# Patient Record
Sex: FEMALE | Race: WHITE | NOT HISPANIC OR LATINO | Employment: FULL TIME | URBAN - METROPOLITAN AREA
[De-identification: names, ages, dates, MRNs, and addresses within clinical notes are randomized per-mention and may not be internally consistent; named-entity substitution may affect disease eponyms.]

---

## 2018-02-26 ENCOUNTER — OFFICE VISIT (OUTPATIENT)
Dept: FAMILY MEDICINE CLINIC | Facility: CLINIC | Age: 43
End: 2018-02-26
Payer: COMMERCIAL

## 2018-02-26 VITALS
OXYGEN SATURATION: 98 % | SYSTOLIC BLOOD PRESSURE: 110 MMHG | WEIGHT: 225 LBS | HEART RATE: 77 BPM | TEMPERATURE: 98.9 F | DIASTOLIC BLOOD PRESSURE: 78 MMHG | BODY MASS INDEX: 37.44 KG/M2

## 2018-02-26 DIAGNOSIS — J06.9 UPPER RESPIRATORY TRACT INFECTION, UNSPECIFIED TYPE: Primary | ICD-10-CM

## 2018-02-26 DIAGNOSIS — J06.9 VIRAL UPPER RESPIRATORY TRACT INFECTION: ICD-10-CM

## 2018-02-26 PROCEDURE — 99213 OFFICE O/P EST LOW 20 MIN: CPT | Performed by: PHYSICIAN ASSISTANT

## 2018-02-26 RX ORDER — DESOGESTREL AND ETHINYL ESTRADIOL AND ETHINYL ESTRADIOL 21-5 (28)
1 KIT ORAL DAILY
Refills: 4 | COMMUNITY
Start: 2018-01-27 | End: 2020-09-17 | Stop reason: ALTCHOICE

## 2018-02-26 NOTE — PROGRESS NOTES
Assessment/Plan:       Diagnoses and all orders for this visit:    Upper respiratory tract infection, unspecified type    Viral upper respiratory tract infection          Subjective:      Patient ID: Raul Mayorga is a 37 y o  female  Cough  Patient complains of dyspnea, headache , nasal congestion, nonproductive cough and sore throat  Symptoms began 3 days ago  Symptoms have been gradually improving since that time  The cough is dry and is aggravated by nothing  Associated symptoms include: change in voice  Patient does not have new pets  Patient does not have a history of asthma  Patient does not have a history of environmental allergens  Patient has not traveled recently  Patient does not have a history of smoking  Patient has not had a previous chest x-ray  Patient has not had a PPD done  The following portions of the patient's history were reviewed and updated as appropriate:   She has no past medical history on file  ,   does not have any pertinent problems on file  ,   has a past surgical history that includes Gastric bypass  ,  family history is not on file  ,   reports that she has never smoked  She has never used smokeless tobacco  She reports that she does not drink alcohol or use drugs  ,  has No Known Allergies     Current Outpatient Prescriptions   Medication Sig Dispense Refill    VIORELE 0 15-0 02/0 01 MG (21/5) per tablet Take 1 tablet by mouth daily  4     No current facility-administered medications for this visit  Review of Systems   Constitutional: Positive for fatigue  Negative for fever  HENT: Positive for congestion, postnasal drip, sinus pressure and sore throat  Respiratory: Positive for cough  Neurological: Positive for headaches  Objective:  Vitals:    02/26/18 1431   BP: 110/78   Pulse: 77   Temp: 98 9 °F (37 2 °C)   SpO2: 98%      Physical Exam   Constitutional: She is oriented to person, place, and time  She appears well-developed and well-nourished  HENT:   Head: Normocephalic  Right Ear: External ear normal  Tympanic membrane is not erythematous  No middle ear effusion  Left Ear: External ear normal  Tympanic membrane is not erythematous  No middle ear effusion  Nose: Nose normal    Mouth/Throat: Oropharynx is clear and moist  No oropharyngeal exudate  Cardiovascular: Normal rate and regular rhythm  Pulmonary/Chest: Effort normal and breath sounds normal  She has no wheezes  She has no rales  Neurological: She is alert and oriented to person, place, and time  Skin: Skin is warm and dry  Psychiatric: She has a normal mood and affect  Her behavior is normal    Nursing note and vitals reviewed

## 2018-02-26 NOTE — PATIENT INSTRUCTIONS
Cold Symptoms, Ambulatory Care   GENERAL INFORMATION:   Cold symptoms  include sneezing, dry throat, a stuffy nose, headache, watery eyes, and a cough  Your cough may be dry, or you may cough up mucus  You may also have muscle aches, joint pain, and tiredness  Rarely, you may have a fever  Cold symptoms occur from inflammation in your upper respiratory system caused by a virus  Most colds go away without treatment  Seek immediate care for the following symptoms:   · A heartbeat that is much faster than usual for you     · A swollen neck that is sore to the touch     · Increased tiredness and weakness    · Pinpoint or larger reddish-purple dots on your skin     · Poor or no appetite  Treatment for cold symptoms  may include NSAIDS to decrease muscle aches and fever  Do not give NSAID medicines to children under 10months of age without direction from your child's doctor  Cold medicines may also be given to decrease coughing, nasal stuffiness, sneezing, and a runny nose  Do not give cold medicines to children under 11years of age without direction from your child's doctor  Manage your cold symptoms with the following:   · Drink liquids  to help thin and loosen thick mucus so you can cough it up  Liquids will also keep you hydrated  Ask your healthcare provider which liquids are best for you and how much to drink each day  · Do not smoke  because it may worsen your symptoms and increase the length of time you feel sick  Talk with your healthcare provider if you need help to stop smoking  Prevent the spread of germs  by washing your hands often  You can spread your cold germs to others for at least 3 days after your symptoms start  Do not share items, such as eating utensils  Cover your nose and mouth when you cough or sneeze using the crook of your elbow instead of your hands  Throw used tissues in the garbage    Follow up with your healthcare provider as directed:  Write down your questions so you remember to ask them during your visits  CARE AGREEMENT:   You have the right to help plan your care  Learn about your health condition and how it may be treated  Discuss treatment options with your caregivers to decide what care you want to receive  You always have the right to refuse treatment  The above information is an  only  It is not intended as medical advice for individual conditions or treatments  Talk to your doctor, nurse or pharmacist before following any medical regimen to see if it is safe and effective for you  © 2014 [address] is for End User's use only and may not be sold, redistributed or otherwise used for commercial purposes  All illustrations and images included in CareNotes® are the copyrighted property of [employer]  or [employer]

## 2018-07-08 ENCOUNTER — OFFICE VISIT (OUTPATIENT)
Dept: URGENT CARE | Facility: CLINIC | Age: 43
End: 2018-07-08
Payer: COMMERCIAL

## 2018-07-08 ENCOUNTER — APPOINTMENT (OUTPATIENT)
Dept: RADIOLOGY | Facility: CLINIC | Age: 43
End: 2018-07-08
Payer: COMMERCIAL

## 2018-07-08 VITALS
WEIGHT: 218 LBS | HEIGHT: 64 IN | BODY MASS INDEX: 37.22 KG/M2 | TEMPERATURE: 98 F | SYSTOLIC BLOOD PRESSURE: 136 MMHG | OXYGEN SATURATION: 97 % | RESPIRATION RATE: 17 BRPM | HEART RATE: 97 BPM | DIASTOLIC BLOOD PRESSURE: 78 MMHG

## 2018-07-08 DIAGNOSIS — S69.91XA INJURY OF RIGHT WRIST, INITIAL ENCOUNTER: Primary | ICD-10-CM

## 2018-07-08 DIAGNOSIS — S69.91XA INJURY OF RIGHT WRIST, INITIAL ENCOUNTER: ICD-10-CM

## 2018-07-08 PROCEDURE — 99203 OFFICE O/P NEW LOW 30 MIN: CPT | Performed by: PHYSICIAN ASSISTANT

## 2018-07-08 PROCEDURE — 73110 X-RAY EXAM OF WRIST: CPT

## 2018-07-08 NOTE — PROGRESS NOTES
[facility]        NAME: Joe Philip is a 37 y o  female  : 1975    MRN: [id number]  DATE: 2018  TIME: 8:38 AM    Assessment and Plan   Injury of right wrist, initial encounter [S69 91XA]  1  Injury of right wrist, initial encounter  XR wrist 3+ vw right         Patient Instructions     No fracture on x-ray  Possible TFCC or UCL injury  Placed in a Velcro wrist splint  Ice and Motrin  If not improved in 1-2 weeks see Orthopedics  Follow up with PCP in 3-5 days  Proceed to  ER if symptoms worsen  Chief Complaint     Chief Complaint   Patient presents with    Wrist Injury     started yesterday, holding water felt pressure on Rt wrist, pt felt sharp pain, c/o pain and mild swelling/redness,limited ROM of rt wrist         History of Present Illness       78-year-old female complains of right ulnar wrist pain for 1 day  She was holding watering can up and felt a pain and pop in her wrist   She is right-handed  Painful gripping moving her hand  Review of Systems   Review of Systems      Current Medications       Current Outpatient Prescriptions:     Joeline Poot 0 15-0 02/0 01 MG () per tablet, Take 1 tablet by mouth daily, Disp: , Rfl: 4    Current Allergies     Allergies as of 2018    (No Known Allergies)            The following portions of the patient's history were reviewed and updated as appropriate: allergies, current medications, past family history, past medical history, past social history, past surgical history and problem list      History reviewed  No pertinent past medical history  Past Surgical History:   Procedure Laterality Date    GASTRIC BYPASS      10 years ago       Family History   Problem Relation Age of Onset    Asthma Mother     No Known Problems Father          Medications have been verified          Objective   /78   Pulse 97   Temp 98 °F (36 7 °C) (Tympanic)   Resp 17   Ht 5' 4" (1 626 m)   Wt 98 9 kg (218 lb)   SpO2 97% BMI 37 42 kg/m²        Physical Exam     Physical Exam   Constitutional: She appears well-developed and well-nourished  Musculoskeletal:   Right wrist no swelling or bruising  Tender palpation over the ulnar wrist and dorsal wrist   Wrist pain with ulnar deviation and flexion extension  Hand neurovascularly intact  Elbow nontender full range of motion

## 2018-07-08 NOTE — PATIENT INSTRUCTIONS
No fracture on x-ray  Possible TFCC or UCL injury  Placed in a Velcro wrist splint  Ice and Motrin  If not improved in 1-2 weeks see Orthopedics  Follow up with PCP in 3-5 days  Proceed to  ER if symptoms worsen

## 2019-03-01 ENCOUNTER — OFFICE VISIT (OUTPATIENT)
Dept: URGENT CARE | Facility: CLINIC | Age: 44
End: 2019-03-01
Payer: COMMERCIAL

## 2019-03-01 VITALS
OXYGEN SATURATION: 100 % | WEIGHT: 225 LBS | RESPIRATION RATE: 18 BRPM | TEMPERATURE: 98.2 F | SYSTOLIC BLOOD PRESSURE: 138 MMHG | DIASTOLIC BLOOD PRESSURE: 84 MMHG | BODY MASS INDEX: 38.41 KG/M2 | HEART RATE: 77 BPM | HEIGHT: 64 IN

## 2019-03-01 DIAGNOSIS — J01.90 ACUTE SINUSITIS, RECURRENCE NOT SPECIFIED, UNSPECIFIED LOCATION: Primary | ICD-10-CM

## 2019-03-01 PROCEDURE — 99213 OFFICE O/P EST LOW 20 MIN: CPT | Performed by: PHYSICIAN ASSISTANT

## 2019-03-01 RX ORDER — DIPHENOXYLATE HYDROCHLORIDE AND ATROPINE SULFATE 2.5; .025 MG/1; MG/1
1 TABLET ORAL
COMMUNITY

## 2019-03-01 RX ORDER — AMOXICILLIN AND CLAVULANATE POTASSIUM 875; 125 MG/1; MG/1
1 TABLET, FILM COATED ORAL EVERY 12 HOURS SCHEDULED
Qty: 20 TABLET | Refills: 0 | Status: SHIPPED | OUTPATIENT
Start: 2019-03-01 | End: 2019-03-11

## 2019-03-01 RX ORDER — METHYLPREDNISOLONE 4 MG/1
TABLET ORAL
Qty: 1 EACH | Refills: 0 | Status: SHIPPED | OUTPATIENT
Start: 2019-03-01 | End: 2019-04-01 | Stop reason: ALTCHOICE

## 2019-03-01 NOTE — PROGRESS NOTES
[facility]        NAME: Lio Villanueva is a 40 y o  female  : 1975    MRN: [id number]  DATE: 2019  TIME: 11:10 AM    Assessment and Plan   Acute sinusitis, recurrence not specified, unspecified location [J01 90]  1  Acute sinusitis, recurrence not specified, unspecified location  methylPREDNISolone 4 MG tablet therapy pack    amoxicillin-clavulanate (AUGMENTIN) 875-125 mg per tablet         Patient Instructions     Follow up with PCP in 3-5 days  Proceed to  ER if symptoms worsen  Chief Complaint     Chief Complaint   Patient presents with    Nasal Congestion     runny and stuffy, unable to breath out of nose, tried otc decongestion meds  , x 4 days    Sore Throat     x today    sinus pain/pressure     x 4 days    Chills     had the first 2 days    Earache     right ear x last night         History of Present Illness       Sinus Pain  Patient complains of congestion, cough, facial pain and post nasal drip  Onset of symptoms was 4 days ago  Symptoms have been gradually worsening since that time  She is drinking moderate amounts of fluids  Past history is significant for nothing  Patient is non-smoker  Review of Systems   Review of Systems   Constitutional: Negative for chills, fatigue and fever  HENT: Positive for congestion, ear pain, postnasal drip, sinus pressure and sinus pain  Negative for sore throat and trouble swallowing  Eyes: Negative for pain, discharge and redness  Respiratory: Positive for cough  Negative for chest tightness, shortness of breath and wheezing  Cardiovascular: Negative for chest pain, palpitations and leg swelling  Gastrointestinal: Negative for abdominal pain, diarrhea, nausea and vomiting  Musculoskeletal: Negative for arthralgias, joint swelling and myalgias  Skin: Negative for rash  Neurological: Negative for dizziness, weakness, numbness and headaches           Current Medications       Current Outpatient Medications:    VIORELE 0 15-0 02/0 01 MG (21/5) per tablet, Take 1 tablet by mouth daily, Disp: , Rfl: 4    amoxicillin-clavulanate (AUGMENTIN) 875-125 mg per tablet, Take 1 tablet by mouth every 12 (twelve) hours for 10 days, Disp: 20 tablet, Rfl: 0    methylPREDNISolone 4 MG tablet therapy pack, Use as directed on package, Disp: 1 each, Rfl: 0    multivitamin (THERAGRAN) TABS, Take 1 tablet by mouth, Disp: , Rfl:     Current Allergies     Allergies as of 03/01/2019    (No Known Allergies)            The following portions of the patient's history were reviewed and updated as appropriate: allergies, current medications, past family history, past medical history, past social history, past surgical history and problem list      History reviewed  No pertinent past medical history  Past Surgical History:   Procedure Laterality Date    GASTRIC BYPASS      10 years ago       Family History   Problem Relation Age of Onset    Asthma Mother     No Known Problems Father          Medications have been verified  Objective   /84 (BP Location: Left arm, Patient Position: Sitting, Cuff Size: Standard)   Pulse 77   Temp 98 2 °F (36 8 °C) (Tympanic)   Resp 18   Ht 5' 4" (1 626 m)   Wt 102 kg (225 lb)   SpO2 100%   BMI 38 62 kg/m²        Physical Exam     Physical Exam   Constitutional: She is oriented to person, place, and time  She appears well-developed and well-nourished  No distress  HENT:   Head: Normocephalic  Right Ear: External ear normal    Left Ear: External ear normal    Mouth/Throat: Posterior oropharyngeal erythema present  Eyes: Pupils are equal, round, and reactive to light  Conjunctivae and EOM are normal    Neck: Normal range of motion  Neck supple  Cardiovascular: Normal rate, regular rhythm and normal heart sounds  No murmur heard  Pulmonary/Chest: Effort normal and breath sounds normal  No respiratory distress  She has no wheezes  Abdominal: Soft   Bowel sounds are normal  There is no tenderness  Musculoskeletal: Normal range of motion  Lymphadenopathy:     She has no cervical adenopathy  Neurological: She is alert and oriented to person, place, and time  She has normal reflexes  Skin: Skin is warm and dry  Psychiatric: She has a normal mood and affect  Nursing note and vitals reviewed

## 2019-04-01 ENCOUNTER — OFFICE VISIT (OUTPATIENT)
Dept: FAMILY MEDICINE CLINIC | Facility: CLINIC | Age: 44
End: 2019-04-01
Payer: COMMERCIAL

## 2019-04-01 VITALS
HEART RATE: 88 BPM | HEIGHT: 64 IN | BODY MASS INDEX: 38.17 KG/M2 | OXYGEN SATURATION: 98 % | TEMPERATURE: 99 F | DIASTOLIC BLOOD PRESSURE: 88 MMHG | WEIGHT: 223.6 LBS | SYSTOLIC BLOOD PRESSURE: 126 MMHG

## 2019-04-01 DIAGNOSIS — L73.9 FOLLICULITIS: Primary | ICD-10-CM

## 2019-04-01 PROBLEM — J06.9 UPPER RESPIRATORY TRACT INFECTION: Status: RESOLVED | Noted: 2018-02-26 | Resolved: 2019-04-01

## 2019-04-01 PROCEDURE — 99213 OFFICE O/P EST LOW 20 MIN: CPT | Performed by: NURSE PRACTITIONER

## 2019-04-01 PROCEDURE — 3008F BODY MASS INDEX DOCD: CPT | Performed by: NURSE PRACTITIONER

## 2019-04-01 RX ORDER — CEPHALEXIN 500 MG/1
500 CAPSULE ORAL EVERY 8 HOURS SCHEDULED
Qty: 21 CAPSULE | Refills: 0 | Status: SHIPPED | OUTPATIENT
Start: 2019-04-01 | End: 2019-04-08

## 2019-07-05 NOTE — PROGRESS NOTES
Assessment/Plan:    Varicose veins of bilateral lower extremities with pain  Patient with symptomatic bilateral lower extremity varicosities with pain  We discussed the pathophysiology of venous disease  She will trial a course of conservative therapy to include the daily use of 20-30 mmHg compression stockings  We discussed the benefits of periodic elevation, regular physical activity and weight management  After a trial of conservative therapy she will have a venous reflux assessment to evaluate for valvular incompetence  Followup with vascular surgeon after imaging for review/to discuss response to conservative therapy and further possible treatment options  Diagnoses and all orders for this visit:    Varicose veins of bilateral lower extremities with pain  -     VAS reflux lower limb venous duplex study with reflux assessment, complete bilateral; Future  -     Compression Stocking  -     Compression Stocking    Other orders  -     ibuprofen (ADVIL) 200 mg tablet; Advil 200 mg tablet   Take 2 tablets every day by oral route  Subjective:      Patient ID: Jackelin Pathak is a 40 y o  female  Patient seen for evaluation of bilateral lower extremity varicose veins  She has bilateral lower extremity varicosities with pain  She complains of daily aching pain and tiredness to the legs, worse at end of day  She complains of pulsating pain to bilateral legs when laying in bed at night  The pain has gotten so bad over the past year that she is taking Aleve with only slight improvement in symptoms  She says that when she walks a lot or tries to exercise she gets swelling around her ankles  She elevates her legs occasionally, but doesn't notice any significant difference/improvement in symptoms  She does not wear compression stockings  No skin changes/tissue loss  Pt is new to our practice and is for an evaluation of painful bulging veins    Pt has a bulging vein in her right leg since her teens  Pt no has a bulging vein in her left leg for the past 6 months  Veins in both legs are painful  Pt does not wear compression stocking but does elevate her legs as much as possible  Pt has had no testing for this condition  Pt is not taking any blood thinning medications or statins  The following portions of the patient's history were reviewed and updated as appropriate: allergies, current medications, past family history, past medical history, past social history, past surgical history and problem list     Review of Systems   Constitutional: Negative  HENT: Negative  Eyes: Negative  Respiratory: Negative  Cardiovascular: Positive for leg swelling  Painful veins in both legs   Gastrointestinal: Negative  Endocrine: Negative  Genitourinary: Negative  Musculoskeletal:        Leg Pain   Skin: Negative  Allergic/Immunologic: Negative  Neurological: Negative  Hematological: Negative  Psychiatric/Behavioral: Negative  Objective:       Physical Exam   Constitutional: She is oriented to person, place, and time  She appears well-nourished  No distress  HENT:   Head: Normocephalic and atraumatic  Eyes: No scleral icterus  Cardiovascular: Normal rate and regular rhythm  Pulses:       Posterior tibial pulses are 2+ on the right side, and 2+ on the left side  Bilateral lower extremity varicose veins   Pulmonary/Chest: Effort normal  No respiratory distress  Abdominal: Soft  There is no tenderness  Musculoskeletal: She exhibits no edema  Neurological: She is alert and oriented to person, place, and time  Skin: Skin is warm and dry  Psychiatric: She has a normal mood and affect  I have reviewed and made appropriate changes to the review of systems input by the medical assistant      Vitals:    07/08/19 1008   BP: 140/88   BP Location: Left arm   Patient Position: Sitting   Cuff Size: Adult   Pulse: 86   Resp: 16   Temp: 98 6 °F (37 °C) TempSrc: Tympanic   Weight: 101 kg (223 lb)   Height: 5' 4" (1 626 m)       Patient Active Problem List   Diagnosis    Folliculitis    Varicose veins of bilateral lower extremities with pain       Past Surgical History:   Procedure Laterality Date    GASTRIC BYPASS      10 years ago       Family History   Problem Relation Age of Onset   [address] [facility] Asthma Mother     No Known Problems Father        Social History     Socioeconomic History    Marital status: /Civil Union     Spouse name: Not on file    Number of children: Not on file    Years of education: Not on file    Highest education level: Not on file   Occupational History    Not on file   Social Needs    Financial resource strain: Not on file    Food insecurity:     Worry: Not on file     Inability: Not on file    Transportation needs:     Medical: Not on file     Non-medical: Not on file   Tobacco Use    Smoking status: Never Smoker    Smokeless tobacco: Never Used   Substance and Sexual Activity    Alcohol use: No    Drug use: No    Sexual activity: Not on file   Lifestyle    Physical activity:     Days per week: Not on file     Minutes per session: Not on file    Stress: Not on file   Relationships    Social connections:     Talks on phone: Not on file     Gets together: Not on file     Attends Ca[employer] service: Not on file     Active member of club or organization: Not on file     Attends meetings of clubs or organizations: Not on file     Relationship status: Not on file    Intimate partner violence:     Fear of current or ex partner: Not on file     Emotionally abused: Not on file     Physically abused: Not on file     Forced sexual activity: Not on file   Other Topics Concern    Not on file   Social History Narrative    Not on file       No Known Allergies      Current Outpatient Medications:     ibuprofen (ADVIL) 200 mg tablet, Advil 200 mg tablet  Take 2 tablets every day by oral route , Disp: , Rfl:     multivitamin (THERAGRAN) TABS, Take 1 tablet by mouth, Disp: , Rfl:     VIORELE 0 15-0 02/0 01 MG (21/5) per tablet, Take 1 tablet by mouth daily, Disp: , Rfl: 4

## 2019-07-08 ENCOUNTER — CONSULT (OUTPATIENT)
Dept: VASCULAR SURGERY | Facility: CLINIC | Age: 44
End: 2019-07-08
Payer: COMMERCIAL

## 2019-07-08 VITALS
TEMPERATURE: 98.6 F | BODY MASS INDEX: 38.07 KG/M2 | WEIGHT: 223 LBS | DIASTOLIC BLOOD PRESSURE: 88 MMHG | HEIGHT: 64 IN | RESPIRATION RATE: 16 BRPM | HEART RATE: 86 BPM | SYSTOLIC BLOOD PRESSURE: 140 MMHG

## 2019-07-08 DIAGNOSIS — I83.813 VARICOSE VEINS OF BILATERAL LOWER EXTREMITIES WITH PAIN: Primary | ICD-10-CM

## 2019-07-08 PROCEDURE — 99204 OFFICE O/P NEW MOD 45 MIN: CPT | Performed by: NURSE PRACTITIONER

## 2019-07-08 RX ORDER — IBUPROFEN 200 MG
TABLET ORAL
COMMUNITY
End: 2019-08-26 | Stop reason: ALTCHOICE

## 2019-07-08 NOTE — ASSESSMENT & PLAN NOTE
Patient with symptomatic bilateral lower extremity varicosities with pain  We discussed the pathophysiology of venous disease  She will trial a course of conservative therapy to include the daily use of 20-30 mmHg compression stockings  We discussed the benefits of periodic elevation, regular physical activity and weight management  After a trial of conservative therapy she will have a venous reflux assessment to evaluate for valvular incompetence  Followup with vascular surgeon after imaging for review/to discuss response to conservative therapy and further possible treatment options

## 2019-07-08 NOTE — PATIENT INSTRUCTIONS
Bilateral lower extremity varicose veins with pain  -we will initiate a 90 day trial of conservative therapy to include the daily use of 20-30 mmHg knee-high or waist length compression stockings  -other things we discussed that may help manage your symptoms:  Periodic elevation at least above the level of the hip and regular physical activity  -after a trial of conservative therapy you will have a venous reflux assessment/ultrasound to evaluate for venous insufficiency  -follow up with vascular surgeon after imaging to review test results and discuss response to conservative therapy and further possible surgical treatment options

## 2019-08-26 ENCOUNTER — OFFICE VISIT (OUTPATIENT)
Dept: FAMILY MEDICINE CLINIC | Facility: CLINIC | Age: 44
End: 2019-08-26
Payer: COMMERCIAL

## 2019-08-26 VITALS
OXYGEN SATURATION: 98 % | DIASTOLIC BLOOD PRESSURE: 74 MMHG | HEART RATE: 86 BPM | HEIGHT: 64 IN | WEIGHT: 221 LBS | BODY MASS INDEX: 37.73 KG/M2 | TEMPERATURE: 97.7 F | SYSTOLIC BLOOD PRESSURE: 122 MMHG

## 2019-08-26 DIAGNOSIS — Z00.00 ANNUAL PHYSICAL EXAM: Primary | ICD-10-CM

## 2019-08-26 DIAGNOSIS — Z13.6 SCREENING FOR CARDIOVASCULAR CONDITION: ICD-10-CM

## 2019-08-26 DIAGNOSIS — Z13.1 SCREENING FOR DIABETES MELLITUS: ICD-10-CM

## 2019-08-26 DIAGNOSIS — I83.813 VARICOSE VEINS OF BILATERAL LOWER EXTREMITIES WITH PAIN: ICD-10-CM

## 2019-08-26 PROBLEM — L73.9 FOLLICULITIS: Status: RESOLVED | Noted: 2019-04-01 | Resolved: 2019-08-26

## 2019-08-26 PROBLEM — S92.009A FRACTURE OF CALCANEUS: Status: ACTIVE | Noted: 2019-08-26

## 2019-08-26 PROBLEM — S92.009A FRACTURE OF CALCANEUS: Status: RESOLVED | Noted: 2019-08-26 | Resolved: 2019-08-26

## 2019-08-26 PROBLEM — M25.373 ANKLE INSTABILITY: Status: ACTIVE | Noted: 2019-08-26

## 2019-08-26 PROCEDURE — 99396 PREV VISIT EST AGE 40-64: CPT | Performed by: PHYSICIAN ASSISTANT

## 2019-08-26 RX ORDER — MELOXICAM 7.5 MG/1
7.5 TABLET ORAL 2 TIMES DAILY
Qty: 60 TABLET | Refills: 2 | Status: SHIPPED | OUTPATIENT
Start: 2019-08-26 | End: 2020-03-03 | Stop reason: ALTCHOICE

## 2019-08-26 NOTE — PROGRESS NOTES
ADULT ANNUAL PHYSICAL  [facility] - Devyn Michel [id number]    NAME: Lamont Edwards  AGE: 40 y o  SEX: female  : 1975     DATE: 2019     Assessment and Plan:     Problem List Items Addressed This Visit        Cardiovascular and Mediastinum    Varicose veins of bilateral lower extremities with pain    Relevant Medications    meloxicam (MOBIC) 7 5 mg tablet      Other Visit Diagnoses     Annual physical exam    -  Primary    Screening for diabetes mellitus        Relevant Orders    Comprehensive metabolic panel    Screening for cardiovascular condition        Relevant Orders    Lipid panel          Immunizations and preventive care screenings were discussed with patient today  Appropriate education was printed on patient's after visit summary  Counseling:  · Injury prevention: discussed safety/seat belts, safety helmets, smoke detectors, carbon dioxide detectors, and smoking near bedding or upholstery  No follow-ups on file  Chief Complaint:     Chief Complaint   Patient presents with    Physical Exam      History of Present Illness:     Adult Annual Physical   Patient here for a comprehensive physical exam  The patient reports problems - painful varicose veins for which she is seeing vascular surgeon  Diet and Physical Activity  · Diet/Nutrition: well balanced diet  · Exercise: moderate cardiovascular exercise, strength training exercises and 3-4 times a week on average  Depression Screening  PHQ-9 Depression Screening    PHQ-9:    Frequency of the following problems over the past two weeks:       Little interest or pleasure in doing things:  0 - not at all  Feeling down, depressed, or hopeless:  0 - not at all  PHQ-2 Score:  0       General Health  · Sleep: sleeps poorly  · Hearing: normal - bilateral   · Vision: goes for regular eye exams and wears glasses  · Dental: regular dental visits         /GYN Health  · Patient is: premenopausal  · Last menstrual period: 08/17/19  · Contraceptive method: oral contraceptives  Review of Systems:     Review of Systems   Constitutional: Negative for appetite change, fatigue, fever and unexpected weight change  HENT: Negative for dental problem, ear pain, hearing loss, mouth sores, nosebleeds, rhinorrhea, tinnitus, trouble swallowing and voice change  Eyes: Negative for photophobia, pain, discharge and visual disturbance  Respiratory: Negative for cough, chest tightness, shortness of breath and wheezing  Cardiovascular: Positive for leg swelling  Negative for chest pain and palpitations  Gastrointestinal: Negative for abdominal pain, blood in stool, constipation, diarrhea, nausea, rectal pain and vomiting  Endocrine: Negative for cold intolerance, polydipsia, polyphagia and polyuria  Genitourinary: Negative for decreased urine volume, difficulty urinating, dysuria, enuresis, frequency, genital sores, hematuria and urgency  Musculoskeletal: Positive for myalgias  Negative for arthralgias, back pain, gait problem, joint swelling, neck pain and neck stiffness  Skin: Negative for color change and rash  Allergic/Immunologic: Negative for environmental allergies, food allergies and immunocompromised state  Neurological: Negative for dizziness, seizures, speech difficulty, light-headedness and headaches  Hematological: Negative for adenopathy  Does not bruise/bleed easily  Psychiatric/Behavioral: Negative for behavioral problems, confusion, decreased concentration, self-injury and sleep disturbance  The patient is not nervous/anxious and is not hyperactive  Past Medical History:     No past medical history on file     Past Surgical History:     Past Surgical History:   Procedure Laterality Date    GASTRIC BYPASS      10 years ago      Social History:     Social History     Socioeconomic History    Marital status: /Civil Union     Spouse name: None    Number of children: None    Years of education: None    Highest education level: None   Occupational History    None   Social Needs    Financial resource strain: None    Food insecurity:     Worry: None     Inability: None    Transportation needs:     Medical: None     Non-medical: None   Tobacco Use    Smoking status: Never Smoker    Smokeless tobacco: Never Used   Substance and Sexual Activity    Alcohol use: No    Drug use: No    Sexual activity: None   Lifestyle    Physical activity:     Days per week: None     Minutes per session: None    Stress: None   Relationships    Social connections:     Talks on phone: None     Gets together: None     Attends Zoroastrianism service: None     Active member of club or organization: None     Attends meetings of clubs or organizations: None     Relationship status: None    Intimate partner violence:     Fear of current or ex partner: None     Emotionally abused: None     Physically abused: None     Forced sexual activity: None   Other Topics Concern    None   Social History Narrative    None      Family History:     Family History   Problem Relation Age of Onset    Asthma Mother     No Known Problems Father       Current Medications:     Current Outpatient Medications   Medication Sig Dispense Refill    multivitamin (THERAGRAN) TABS Take 1 tablet by mouth      VIORELE 0 15-0 02/0 01 MG (21/5) per tablet Take 1 tablet by mouth daily  4    meloxicam (MOBIC) 7 5 mg tablet Take 1 tablet (7 5 mg total) by mouth 2 (two) times a day 60 tablet 2     No current facility-administered medications for this visit  Allergies:     No Known Allergies   Physical Exam:     /74   Pulse 86   Temp 97 7 °F (36 5 °C)   Ht 5' 4" (1 626 m)   Wt 100 kg (221 lb)   SpO2 98%   BMI 37 93 kg/m²     Physical Exam   Constitutional: She is oriented to person, place, and time  She appears well-developed and well-nourished  HENT:   Head: Normocephalic     Right Ear: Hearing, tympanic membrane, external ear and ear canal normal    Left Ear: Hearing, tympanic membrane, external ear and ear canal normal    Mouth/Throat: Uvula is midline, oropharynx is clear and moist and mucous membranes are normal    Eyes: Pupils are equal, round, and reactive to light  Conjunctivae are normal    Neck: Normal range of motion  Carotid bruit is not present  No thyromegaly present  Cardiovascular: Normal rate, regular rhythm, normal heart sounds and intact distal pulses  Pulmonary/Chest: Effort normal and breath sounds normal    Abdominal: Soft  Bowel sounds are normal  She exhibits no mass  There is no hepatosplenomegaly  There is no tenderness  There is no CVA tenderness  Musculoskeletal: Normal range of motion  Lymphadenopathy:     She has no cervical adenopathy  Neurological: She is alert and oriented to person, place, and time  She has normal reflexes  Skin: Skin is dry  Psychiatric: She has a normal mood and affect  Her behavior is normal  Judgment and thought content normal    Nursing note and vitals reviewed        BALDO Walker [id number]

## 2019-08-26 NOTE — PATIENT INSTRUCTIONS
Wellness Visit for Adults   AMBULATORY CARE:   A wellness visit  is when you see your healthcare provider to get screened for health problems  You can also get advice on how to stay healthy  Write down your questions so you remember to ask them  Ask your healthcare provider how often you should have a wellness visit  What happens at a wellness visit:  Your healthcare provider will ask about your health, and your family history of health problems  This includes high blood pressure, heart disease, and cancer  He or she will ask if you have symptoms that concern you, if you smoke, and about your mood  You may also be asked about your intake of medicines, supplements, food, and alcohol  Any of the following may be done:  · Your weight  will be checked  Your height may also be checked so your body mass index (BMI) can be calculated  Your BMI shows if you are at a healthy weight  · Your blood pressure  and heart rate will be checked  Your temperature may also be checked  · Blood and urine tests  may be done  Blood tests may be done to check your cholesterol levels  Abnormal cholesterol levels increase your risk for heart disease and stroke  You may also need a blood or urine test to check for diabetes if you are at increased risk  Urine tests may be done to look for signs of an infection or kidney disease  · A physical exam  includes checking your heartbeat and lungs with a stethoscope  Your healthcare provider may also check your skin to look for sun damage  · Screening tests  may be recommended  A screening test is done to check for diseases that may not cause symptoms  The screening tests you may need depend on your age, gender, family history, and lifestyle habits  For example, colorectal screening may be recommended if you are 48years old or older  Screening tests you need if you are a woman:   · A Pap smear  is used to screen for cervical cancer   Pap smears are usually done every 3 to 5 years depending on your age  You may need them more often if you have had abnormal Pap smear test results in the past  Ask your healthcare provider how often you should have a Pap smear  · A mammogram  is an x-ray of your breasts to screen for breast cancer  Experts recommend mammograms every 2 years starting at age 48 years  You may need a mammogram at age 52 years or younger if you have an increased risk for breast cancer  Talk to your healthcare provider about when you should start having mammograms and how often you need them  Vaccines you may need:   · Get an influenza vaccine  every year  The influenza vaccine protects you from the flu  Several types of viruses cause the flu  The viruses change over time, so new vaccines are made each year  · Get a tetanus-diphtheria (Td) booster vaccine  every 10 years  This vaccine protects you against tetanus and diphtheria  Tetanus is a severe infection that may cause painful muscle spasms and lockjaw  Diphtheria is a severe bacterial infection that causes a thick covering in the back of your mouth and throat  · Get a human papillomavirus (HPV) vaccine  if you are female and aged 23 to 32 or male 23 to 24 and never received it  This vaccine protects you from HPV infection  HPV is the most common infection spread by sexual contact  HPV may also cause vaginal, penile, and anal cancers  · Get a pneumococcal vaccine  if you are aged 72 years or older  The pneumococcal vaccine is an injection given to protect you from pneumococcal disease  Pneumococcal disease is an infection caused by pneumococcal bacteria  The infection may cause pneumonia, meningitis, or an ear infection  · Get a shingles vaccine  if you are aged 61 or older, even if you have had shingles before  The shingles vaccine is an injection to protect you from the varicella-zoster virus  This is the same virus that causes chickenpox   Shingles is a painful rash that develops in people who had chickenpox or have been exposed to the virus  How to eat healthy:  My Plate is a model for planning healthy meals  It shows the types and amounts of foods that should go on your plate  Fruits and vegetables make up about half of your plate, and grains and protein make up the other half  A serving of dairy is included on the side of your plate  The amount of calories and serving sizes you need depends on your age, gender, weight, and height  Examples of healthy foods are listed below:  · Eat a variety of vegetables  such as dark green, red, and orange vegetables  You can also include canned vegetables low in sodium (salt) and frozen vegetables without added butter or sauces  · Eat a variety of fresh fruits , canned fruit in 100% juice, frozen fruit, and dried fruit  · Include whole grains  At least half of the grains you eat should be whole grains  Examples include whole-wheat bread, wheat pasta, brown rice, and whole-grain cereals such as oatmeal     · Eat a variety of protein foods such as seafood (fish and shellfish), lean meat, and poultry without skin (turkey and chicken)  Examples of lean meats include pork leg, shoulder, or tenderloin, and beef round, sirloin, tenderloin, and extra lean ground beef  Other protein foods include eggs and egg substitutes, beans, peas, soy products, nuts, and seeds  · Choose low-fat dairy products such as skim or 1% milk or low-fat yogurt, cheese, and cottage cheese  · Limit unhealthy fats  such as butter, hard margarine, and shortening  Exercise:  Exercise at least 30 minutes per day on most days of the week  Some examples of exercise include walking, biking, dancing, and swimming  You can also fit in more physical activity by taking the stairs instead of the elevator or parking farther away from stores  Include muscle strengthening activities 2 days each week  Regular exercise provides many health benefits   It helps you manage your weight, and decreases your risk for type 2 diabetes, heart disease, stroke, and high blood pressure  Exercise can also help improve your mood  Ask your healthcare provider about the best exercise plan for you  General health and safety guidelines:   · Do not smoke  Nicotine and other chemicals in cigarettes and cigars can cause lung damage  Ask your healthcare provider for information if you currently smoke and need help to quit  E-cigarettes or smokeless tobacco still contain nicotine  Talk to your healthcare provider before you use these products  · Limit alcohol  A drink of alcohol is 12 ounces of beer, 5 ounces of wine, or 1½ ounces of liquor  · Lose weight, if needed  Being overweight increases your risk of certain health conditions  These include heart disease, high blood pressure, type 2 diabetes, and certain types of cancer  · Protect your skin  Do not sunbathe or use tanning beds  Use sunscreen with a SPF 15 or higher  Apply sunscreen at least 15 minutes before you go outside  Reapply sunscreen every 2 hours  Wear protective clothing, hats, and sunglasses when you are outside  · Drive safely  Always wear your seatbelt  Make sure everyone in your car wears a seatbelt  A seatbelt can save your life if you are in an accident  Do not use your cell phone when you are driving  This could distract you and cause an accident  Pull over if you need to make a call or send a text message  · Practice safe sex  Use latex condoms if are sexually active and have more than one partner  Your healthcare provider may recommend screening tests for sexually transmitted infections (STIs)  · Wear helmets, lifejackets, and protective gear  Always wear a helmet when you ride a bike or motorcycle, go skiing, or play sports that could cause a head injury  Wear protective equipment when you play sports  Wear a lifejacket when you are on a boat or doing water sports    © 2017 [address] Information is for End User's use only and may not be sold, redistributed or otherwise used for commercial purposes  All illustrations and images included in CareNotes® are the copyrighted property of [employer]  or [employer]  The above information is an  only  It is not intended as medical advice for individual conditions or treatments  Talk to your doctor, nurse or pharmacist before following any medical regimen to see if it is safe and effective for you  Cholesterol and Your Health   AMBULATORY CARE:   Cholesterol  is a waxy, fat-like substance  Cholesterol is made by your body, but also comes from certain foods you eat  Your body uses cholesterol to make hormones and new cells  Your body also uses cholesterol to protect nerves  Cholesterol comes from foods such as meat and dairy products  Your total cholesterol level is made up by LDL cholesterol, HDL cholesterol, and triglycerides:  · LDL cholesterol  is called bad cholesterol  because it forms plaque in your arteries  As plaque builds up, your arteries become narrow, and less blood flows through  When plaque decreases blood flow to your heart, you may have chest pain  If plaque completely blocks an artery that bring blood to your heart, you may have a heart attack  Plaque can break off and form blood clots  Blood clots may block arteries in your brain and cause a stroke  · HDL cholesterol  is called good cholesterol  because it helps remove LDL cholesterol from your arteries  It does this by attaching to LDL cholesterol and carrying it to your liver  Your liver breaks down LDL cholesterol so your body can get rid of it  High levels of HDL cholesterol can help prevent a heart attack and stroke  Low levels of HDL cholesterol can increase your risk for heart disease, heart attack, and stroke  · Triglycerides  are a type of fat that store energy from foods you eat  High levels of triglycerides also cause plaque buildup   This can increase your risk for a heart attack or stroke  If your triglyceride level is high, your LDL cholesterol level may also be high  How food affects your cholesterol levels:   · Unhealthy fats  increase LDL cholesterol and triglyceride levels in your blood  They are found in foods high in cholesterol, saturated fat, and trans fat:     ¨ Cholesterol  is found in eggs, dairy, and meat  ¨ Saturated fat  is found in butter, cheese, ice cream, whole milk, and coconut oil  Saturated fat is also found in meat, such as sausage, hot dogs, and bologna  ¨ Trans fat  is found in liquid oils and is used in fried and baked foods  Foods that contain trans fats include chips, crackers, muffins, sweet rolls, microwave popcorn, and cookies  · Healthy fats,  also called unsaturated fats, help lower LDL cholesterol and triglyceride levels  Healthy fats include the following:     ¨ Monounsaturated fats  are found in foods such as olive oil, canola oil, avocado, nuts, and olives  ¨ Polyunsaturated fats,  such as omega 3 fats, are found in fish, such as salmon, trout, and tuna  They can also be found in plant foods such as flaxseed, walnuts, and soybeans  Other things that affect your cholesterol levels:   · Smoking cigarettes    · Being overweight or obese     · Drinking large amounts of alcohol    · Not enough exercise or no exercise    · Certain genes passed from your parents to you  What you need to know about having your cholesterol levels checked: Adults 21to 39years of age should have their cholesterol levels checked every 4 to 6 years  Adults 45 years and older should have their cholesterol checked every 1 to 2 years  You may need your cholesterol checked more often, or at a younger age, if you have risk factors for heart disease  You may also need to have your cholesterol checked more often if you have other health conditions, such as diabetes  Blood tests are used to check cholesterol levels   Blood tests measure your levels of triglycerides, LDL cholesterol, and HDL cholesterol  Cholesterol level goals: Your cholesterol level goal may depend on your risk for heart disease  It may also depend on your age and other health conditions  Ask your healthcare provider if the following goals are right for you:  · Your total cholesterol level  should be less than 200 mg/dL  This number may also depend on your HDL and LDL cholesterol goals  · Your LDL cholesterol level  should be less than 130 mg/dL  · Your HDL cholesterol level  should be 60 mg/dL or higher  · Your triglyceride level  should be less than 150 mg/dL  Treatment for high cholesterol:  Treatment for high cholesterol will also decrease your risk of heart disease, heart attack, and stroke  Treatment may include any of the following:  · Medicines  may be given to lower your LDL cholesterol, triglyceride levels, or total cholesterol level  You may need medicines to lower your cholesterol if any of the following is true:     ¨ You have a history of stroke, TIA, unstable angina, or a heart attack    ¨ Your LDL cholesterol level is 190 mg/dL or higher    ¨ You are age 36to 76years of age, have diabetes, and your LDL cholesterol is 70 mg/dL or higher    ¨ You are age 36to 76years of age, have risk factors for heart disease, and your LDL cholesterol is 70 mg/dL or higher    · Lifestyle changes  include changes to your diet, exercise, weight loss, and quitting smoking  It also includes decreasing the amount of alcohol you drink  · Supplements  include fish oil, red yeast rice, and garlic  Fish oil may help lower your triglyceride and LDL cholesterol levels  It may also increase your HDL cholesterol level  Red yeast rice may help decrease your total cholesterol level and LDL cholesterol level  Garlic may help lower your total cholesterol level  Do not take these supplements without talking to your healthcare provider     Nutrition to help lower your cholesterol levels:  A registered dietitian can help you create a healthy eating plan  Read food labels and choose foods low in saturated fat, trans fats, and cholesterol  · Decrease the total amount of fat you eat  Choose lean meats, fat-free or 1% fat milk, and low-fat dairy products, such as yogurt and cheese  Try to limit or avoid red meats  Limit or do not eat fried foods or baked goods such as cookies  · Replace unhealthy fats with healthy fats  Cook foods in olive oil or canola oil  Choose soft margarines that are low in saturated fat and trans fat  Seeds, nuts, and avocados are other examples of healthy fats  · Eat foods with omega-3 fats  Examples include salmon, tuna, mackerel, walnuts, and flaxseed  Eat fish 2 times per week  Children and pregnant women should not eat fish that have high levels of mercury, such as shark, swordfish, and kia mackerel  · Increase the amount of plant-based foods you eat  Plant-based foods are low in cholesterol and fat  Eating more of these foods may help lower your cholesterol and help you lose weight  Examples of plant-based foods includes fruits, vegetables, legumes, and whole grains  Replace milk that contains dairy with almond, soy, or coconut milk  Eat beans and foods with soy for protein instead of meat  Ask your healthcare provider or dietitian for more information on plant-based foods  · Increase the amount of fiber you eat  High-fiber foods can help lower your LDL cholesterol  You should eat between 20 and 30 grams of fiber each day  Eat at least 5 servings of fruits and vegetables each day  Other examples of high-fiber foods include whole-grain or whole-wheat breads, pastas, or cereals, and brown rice  Eat 3 ounces of whole-grain foods each day  Increase fiber slowly  You may have abdominal discomfort, bloating, and gas if you add fiber to your diet too quickly  Lifestyle changes you can make to help lower your cholesterol levels:   · Maintain a healthy weight  Ask your healthcare provider how much you should weigh  Ask him or her to help you create a weight loss plan if you are overweight  Weight loss can decrease your total cholesterol and triglyceride levels  · Exercise regularly  Exercise can help lower your total cholesterol level and maintain a healthy weight  Exercise can also help increase your HDL cholesterol level  Work with your healthcare provider to create an exercise program that is right for you  Get at least 30 minutes of moderate exercise most days of the week  Examples of exercise include brisk walking, swimming, or biking  · Do not smoke  Nicotine and other chemicals in cigarettes and cigars can damage your lungs, heart, and blood vessels  They can also raise your triglyceride levels  Ask your healthcare provider for information if you currently smoke and need help to quit  E-cigarettes or smokeless tobacco still contain nicotine  Talk to your healthcare provider before you use these products  · Limit or do not drink alcohol  Alcohol can increase your triglyceride levels  Ask your healthcare provider if it is safe for you to drink alcohol  Also ask how much is safe for you to drink each day  © 2017 [address] [facility] Information is for End User's use only and may not be sold, redistributed or otherwise used for commercial purposes  All illustrations and images included in CareNotes® are the copyrighted property of [employer]  or [employer]  The above information is an  only  It is not intended as medical advice for individual conditions or treatments  Talk to your doctor, nurse or pharmacist before following any medical regimen to see if it is safe and effective for you

## 2019-08-29 ENCOUNTER — TELEPHONE (OUTPATIENT)
Dept: FAMILY MEDICINE CLINIC | Facility: CLINIC | Age: 44
End: 2019-08-29

## 2019-08-29 ENCOUNTER — APPOINTMENT (OUTPATIENT)
Dept: LAB | Facility: HOSPITAL | Age: 44
End: 2019-08-29
Payer: COMMERCIAL

## 2019-08-29 DIAGNOSIS — Z13.1 SCREENING FOR DIABETES MELLITUS: ICD-10-CM

## 2019-08-29 DIAGNOSIS — Z13.6 SCREENING FOR CARDIOVASCULAR CONDITION: ICD-10-CM

## 2019-08-29 LAB
ALBUMIN SERPL BCP-MCNC: 3.3 G/DL (ref 3.5–5)
ALP SERPL-CCNC: 106 U/L (ref 46–116)
ALT SERPL W P-5'-P-CCNC: 15 U/L (ref 12–78)
ANION GAP SERPL CALCULATED.3IONS-SCNC: 7 MMOL/L (ref 4–13)
AST SERPL W P-5'-P-CCNC: 12 U/L (ref 5–45)
BILIRUB SERPL-MCNC: 0.3 MG/DL (ref 0.2–1)
BUN SERPL-MCNC: 16 MG/DL (ref 5–25)
CALCIUM SERPL-MCNC: 8.9 MG/DL (ref 8.3–10.1)
CHLORIDE SERPL-SCNC: 102 MMOL/L (ref 100–108)
CHOLEST SERPL-MCNC: 164 MG/DL (ref 50–200)
CO2 SERPL-SCNC: 28 MMOL/L (ref 21–32)
CREAT SERPL-MCNC: 0.7 MG/DL (ref 0.6–1.3)
GFR SERPL CREATININE-BSD FRML MDRD: 106 ML/MIN/1.73SQ M
GLUCOSE P FAST SERPL-MCNC: 94 MG/DL (ref 65–99)
HDLC SERPL-MCNC: 62 MG/DL (ref 40–60)
LDLC SERPL CALC-MCNC: 86 MG/DL (ref 0–100)
NONHDLC SERPL-MCNC: 102 MG/DL
POTASSIUM SERPL-SCNC: 4.1 MMOL/L (ref 3.5–5.3)
PROT SERPL-MCNC: 7.3 G/DL (ref 6.4–8.2)
SODIUM SERPL-SCNC: 137 MMOL/L (ref 136–145)
TRIGL SERPL-MCNC: 80 MG/DL

## 2019-08-29 PROCEDURE — 80061 LIPID PANEL: CPT

## 2019-08-29 PROCEDURE — 36415 COLL VENOUS BLD VENIPUNCTURE: CPT

## 2019-08-29 PROCEDURE — 80053 COMPREHEN METABOLIC PANEL: CPT

## 2019-08-29 NOTE — TELEPHONE ENCOUNTER
L/M FOR PT THAT HER PHYSICIAN SCREENING FORM IS COMPLETED -there is a fax # on there but l/m to see if she wants us to fax or if she will just  - (in pt pickup folder)

## 2019-10-15 ENCOUNTER — HOSPITAL ENCOUNTER (OUTPATIENT)
Dept: NON INVASIVE DIAGNOSTICS | Facility: CLINIC | Age: 44
Discharge: HOME | End: 2019-10-15
Payer: COMMERCIAL

## 2019-10-15 DIAGNOSIS — I83.813 VARICOSE VEINS OF BILATERAL LOWER EXTREMITIES WITH PAIN: ICD-10-CM

## 2019-10-15 PROCEDURE — 93970 EXTREMITY STUDY: CPT | Performed by: SURGERY

## 2019-10-15 PROCEDURE — 93970 EXTREMITY STUDY: CPT

## 2019-12-03 ENCOUNTER — OFFICE VISIT (OUTPATIENT)
Dept: VASCULAR SURGERY | Facility: CLINIC | Age: 44
End: 2019-12-03
Payer: COMMERCIAL

## 2019-12-03 VITALS
TEMPERATURE: 98.4 F | HEART RATE: 92 BPM | DIASTOLIC BLOOD PRESSURE: 90 MMHG | SYSTOLIC BLOOD PRESSURE: 140 MMHG | WEIGHT: 222 LBS | BODY MASS INDEX: 36.99 KG/M2 | HEIGHT: 65 IN

## 2019-12-03 DIAGNOSIS — I83.813 VARICOSE VEINS OF BILATERAL LOWER EXTREMITIES WITH PAIN: Primary | ICD-10-CM

## 2019-12-03 PROCEDURE — 99213 OFFICE O/P EST LOW 20 MIN: CPT | Performed by: SURGERY

## 2019-12-03 RX ORDER — DESOGESTREL AND ETHINYL ESTRADIOL 21-5 (28)
1 KIT ORAL DAILY
COMMUNITY
Start: 2019-10-02 | End: 2019-12-03 | Stop reason: ALTCHOICE

## 2019-12-03 RX ORDER — UBIDECARENONE 75 MG
CAPSULE ORAL DAILY
COMMUNITY

## 2019-12-03 NOTE — PROGRESS NOTES
Assessment/Plan:    Varicose veins of bilateral lower extremities with pain  Reviewed the pathophysiology of varicose veins and the results of the reflux study with her  Bilateral greater saphenous vein reflux  Patient will benefit from endovenous laser ablation and stab phlebectomy  Risks of the procedure such as deep vein thrombosis and recurrence and bruising were discussed  She understands and agrees to proceed  Plan for one leg at a time  Upon close review of the venous duplex imaging, there is a typographical error, the left greater saphenous vein at the inguinal region is measuring 9 mm and not 0 9 mm as stated in the report  Diagnoses and all orders for this visit:    Varicose veins of bilateral lower extremities with pain  -     Case request operating room: ENDOVASCULAR LASER THERAPY (EVLT); Standing    Other orders  -     Discontinue: desogestrel-ethinyl estradiol (KARIVA) 0 15-0 02/0 01 MG (21/5) per tablet; Take 1 tablet by mouth daily  -     cyanocobalamin (VITAMIN B-12) 100 mcg tablet; Take by mouth daily  -     Diet NPO; Sips with meds; Standing  -     Void on call to OR; Standing  -     Insert peripheral IV; Standing  -     Shower/scrub; Standing  -     Place sequential compression device; Standing          Subjective:      Patient ID: Liza Nunes is a 40 y o  female  HPI  Patient is here for follow-up  She has been wearing compression stockings on a regular basis since July this year however she has not have any significant relief in the symptoms of the enlarged varicose veins  She has bilateral varicose veins with pain and bulging and throbbing veins  Throbbing is mainly towards the end of the day and at night  It mainly occurs in the calf and the legs  She also has large veins that bulge and throb towards the end of the day  Symptoms have been ongoing since she was 13years of age but has progressively worsened over time    She has had a gastric bypass 15 years ago and had lost about 100 lb  She has gained back about 40 lb over the last few years  The following portions of the patient's history were reviewed and updated as appropriate: allergies, current medications, past family history, past medical history, past social history, past surgical history and problem list     Review of Systems   Constitutional: Negative  HENT: Negative  Eyes: Negative  Respiratory: Negative  Cardiovascular:        Painful veins   Gastrointestinal: Negative  Endocrine: Negative  Genitourinary: Negative  Musculoskeletal:        Leg pain   Skin: Negative  Allergic/Immunologic: Negative  Neurological: Negative  Hematological: Negative  Psychiatric/Behavioral: Negative  I have reviewed the review of systems as entered and made appropriate changes as necessary    Objective:      /90 (BP Location: Left arm, Patient Position: Sitting)   Pulse 92   Temp 98 4 °F (36 9 °C) (Tympanic)   Ht 5' 5" (1 651 m)   Wt 101 kg (222 lb)   BMI 36 94 kg/m²          Physical Exam   Constitutional: She is oriented to person, place, and time  She appears well-developed and well-nourished  Cardiovascular: Normal rate and intact distal pulses  Neurological: She is alert and oriented to person, place, and time  Skin: Skin is warm and dry  No erythema  No pallor  Multiple large varicose veins, truncal varicosities scattered along the saphenous vein distribution bilaterally  There are also some reticular veins as well  Nursing note and vitals reviewed

## 2019-12-30 ENCOUNTER — TELEPHONE (OUTPATIENT)
Dept: VASCULAR SURGERY | Facility: CLINIC | Age: 44
End: 2019-12-30

## 2019-12-30 NOTE — TELEPHONE ENCOUNTER
S/w pt and scheduled her procedure for 1-13-20 at [facility] with Dr Arleth Mckeon  She is aware nothing to eat or drink after midnight on 1-12-20  Surgery scheduling form given to prior auth department to determine if prior auth is needed

## 2020-01-02 ENCOUNTER — PREP FOR PROCEDURE (OUTPATIENT)
Dept: VASCULAR SURGERY | Facility: CLINIC | Age: 45
End: 2020-01-02

## 2020-01-02 ENCOUNTER — TELEPHONE (OUTPATIENT)
Dept: ADMINISTRATIVE | Facility: HOSPITAL | Age: 45
End: 2020-01-02

## 2020-01-02 DIAGNOSIS — I83.813 VARICOSE VEINS OF BILATERAL LOWER EXTREMITIES WITH PAIN: Primary | ICD-10-CM

## 2020-01-02 NOTE — PRE-PROCEDURE INSTRUCTIONS
Pre-Surgery Instructions:   Medication Instructions    cyanocobalamin (VITAMIN B-12) 100 mcg tablet Instructed patient per Anesthesia Guidelines   meloxicam (MOBIC) 7 5 mg tablet Instructed patient per Anesthesia Guidelines   multivitamin (THERAGRAN) TABS Instructed patient per Anesthesia Guidelines   VIORELE 0 15-0 02/0 01 MG (21/5) per tablet Instructed patient per Anesthesia Guidelines  Phone assessment done  Patient has hibiclens

## 2020-01-02 NOTE — TELEPHONE ENCOUNTER
Patient called back after speaking with her insurance company  Patient expressed concern when the insurance company told her they will not cover unless it is medically necessary  Writer assured patient that procedure would not take place unless it was medically necessary  Writer provided patient with her diagnosis code [id number]  Patient informed writer that per insurance she is required to cover 10% of what is billed  Patient wanted an estimate  Writer referred patient to the billing department at [phone]  Patient was encouraged to contact our office with any additional questions or concerns

## 2020-01-02 NOTE — TELEPHONE ENCOUNTER
Layla Modi requested writer call patient back to answer questions "This patient left me a message wanting to know about her insurance coverage for her EVLT "    Writer spoke with patient and informed her per [employer], no prior authorization is required for outpatient CPT code 86556  Writer provided patient with the CPT code upon her request so she can speak with her insurance about expected out of pocket expenses  Patient expressed concerns about insurance covering anesthestia cost and hospital cost   Writer informed patient that the CPT codes include the services that are needed to perform the procedure  Writer also informed patient that when inquiring about coverage the facilities information is also given to [employer]  Writer informed patient that facility is in network with [facility]  Patient asked what expensies she would be expected to cover  Writer informed patient it depends on the benefit coverage she has  Writer informed patient they were not a  and it would be best to contact her insurance to see what her deductible and copays are  Patient was encouraged to call back with any additional questions or concerns

## 2020-01-06 ENCOUNTER — ANESTHESIA EVENT (OUTPATIENT)
Dept: PERIOP | Facility: AMBULARY SURGERY CENTER | Age: 45
End: 2020-01-06
Payer: COMMERCIAL

## 2020-01-13 ENCOUNTER — HOSPITAL ENCOUNTER (OUTPATIENT)
Dept: ULTRASOUND IMAGING | Facility: AMBULARY SURGERY CENTER | Age: 45
Discharge: HOME | End: 2020-01-13
Payer: COMMERCIAL

## 2020-01-13 ENCOUNTER — HOSPITAL ENCOUNTER (OUTPATIENT)
Facility: AMBULARY SURGERY CENTER | Age: 45
Setting detail: OUTPATIENT SURGERY
Discharge: HOME | End: 2020-01-13
Attending: SURGERY | Admitting: SURGERY
Payer: COMMERCIAL

## 2020-01-13 ENCOUNTER — ANESTHESIA (OUTPATIENT)
Dept: PERIOP | Facility: AMBULARY SURGERY CENTER | Age: 45
End: 2020-01-13
Payer: COMMERCIAL

## 2020-01-13 VITALS
HEIGHT: 65 IN | WEIGHT: 222 LBS | OXYGEN SATURATION: 99 % | DIASTOLIC BLOOD PRESSURE: 94 MMHG | BODY MASS INDEX: 36.99 KG/M2 | TEMPERATURE: 98.2 F | HEART RATE: 75 BPM | RESPIRATION RATE: 16 BRPM | SYSTOLIC BLOOD PRESSURE: 135 MMHG

## 2020-01-13 DIAGNOSIS — I83.813 VARICOSE VEINS OF BILATERAL LOWER EXTREMITIES WITH PAIN: Primary | ICD-10-CM

## 2020-01-13 DIAGNOSIS — I83.813 VARICOSE VEINS OF BILATERAL LOWER EXTREMITIES WITH PAIN: ICD-10-CM

## 2020-01-13 PROCEDURE — 37766 PHLEB VEINS - EXTREM 20+: CPT | Performed by: SURGERY

## 2020-01-13 PROCEDURE — 93971 EXTREMITY STUDY: CPT

## 2020-01-13 PROCEDURE — 36478 ENDOVENOUS LASER 1ST VEIN: CPT | Performed by: SURGERY

## 2020-01-13 PROCEDURE — 99024 POSTOP FOLLOW-UP VISIT: CPT | Performed by: SURGERY

## 2020-01-13 PROCEDURE — NC001 PR NO CHARGE: Performed by: SURGERY

## 2020-01-13 RX ORDER — METOCLOPRAMIDE HYDROCHLORIDE 5 MG/ML
10 INJECTION INTRAMUSCULAR; INTRAVENOUS ONCE AS NEEDED
Status: DISCONTINUED | OUTPATIENT
Start: 2020-01-13 | End: 2020-01-13 | Stop reason: HOSPADM

## 2020-01-13 RX ORDER — DEXAMETHASONE SODIUM PHOSPHATE 10 MG/ML
INJECTION, SOLUTION INTRAMUSCULAR; INTRAVENOUS AS NEEDED
Status: DISCONTINUED | OUTPATIENT
Start: 2020-01-13 | End: 2020-01-13 | Stop reason: SURG

## 2020-01-13 RX ORDER — LIDOCAINE HYDROCHLORIDE 10 MG/ML
0.5 INJECTION, SOLUTION EPIDURAL; INFILTRATION; INTRACAUDAL; PERINEURAL ONCE AS NEEDED
Status: DISCONTINUED | OUTPATIENT
Start: 2020-01-13 | End: 2020-01-13 | Stop reason: HOSPADM

## 2020-01-13 RX ORDER — SODIUM CHLORIDE, SODIUM LACTATE, POTASSIUM CHLORIDE, CALCIUM CHLORIDE 600; 310; 30; 20 MG/100ML; MG/100ML; MG/100ML; MG/100ML
125 INJECTION, SOLUTION INTRAVENOUS CONTINUOUS
Status: DISCONTINUED | OUTPATIENT
Start: 2020-01-13 | End: 2020-01-13 | Stop reason: HOSPADM

## 2020-01-13 RX ORDER — MAGNESIUM HYDROXIDE 1200 MG/15ML
LIQUID ORAL AS NEEDED
Status: DISCONTINUED | OUTPATIENT
Start: 2020-01-13 | End: 2020-01-13 | Stop reason: HOSPADM

## 2020-01-13 RX ORDER — ONDANSETRON 2 MG/ML
INJECTION INTRAMUSCULAR; INTRAVENOUS AS NEEDED
Status: DISCONTINUED | OUTPATIENT
Start: 2020-01-13 | End: 2020-01-13 | Stop reason: SURG

## 2020-01-13 RX ORDER — HYDROCODONE BITARTRATE AND ACETAMINOPHEN 5; 325 MG/1; MG/1
1 TABLET ORAL EVERY 6 HOURS PRN
Qty: 10 TABLET | Refills: 0 | Status: SHIPPED | OUTPATIENT
Start: 2020-01-13 | End: 2020-01-23

## 2020-01-13 RX ORDER — PROPOFOL 10 MG/ML
INJECTION, EMULSION INTRAVENOUS AS NEEDED
Status: DISCONTINUED | OUTPATIENT
Start: 2020-01-13 | End: 2020-01-13 | Stop reason: SURG

## 2020-01-13 RX ORDER — CEFAZOLIN SODIUM 1 G/3ML
INJECTION, POWDER, FOR SOLUTION INTRAMUSCULAR; INTRAVENOUS AS NEEDED
Status: DISCONTINUED | OUTPATIENT
Start: 2020-01-13 | End: 2020-01-13 | Stop reason: SURG

## 2020-01-13 RX ORDER — SODIUM CHLORIDE 9 MG/ML
INJECTION, SOLUTION INTRAVENOUS AS NEEDED
Status: DISCONTINUED | OUTPATIENT
Start: 2020-01-13 | End: 2020-01-13 | Stop reason: HOSPADM

## 2020-01-13 RX ORDER — ONDANSETRON 2 MG/ML
4 INJECTION INTRAMUSCULAR; INTRAVENOUS ONCE AS NEEDED
Status: DISCONTINUED | OUTPATIENT
Start: 2020-01-13 | End: 2020-01-13 | Stop reason: HOSPADM

## 2020-01-13 RX ORDER — FENTANYL CITRATE 50 UG/ML
INJECTION, SOLUTION INTRAMUSCULAR; INTRAVENOUS AS NEEDED
Status: DISCONTINUED | OUTPATIENT
Start: 2020-01-13 | End: 2020-01-13 | Stop reason: SURG

## 2020-01-13 RX ORDER — FENTANYL CITRATE/PF 50 MCG/ML
25 SYRINGE (ML) INJECTION
Status: DISCONTINUED | OUTPATIENT
Start: 2020-01-13 | End: 2020-01-13 | Stop reason: HOSPADM

## 2020-01-13 RX ORDER — LIDOCAINE HYDROCHLORIDE 10 MG/ML
INJECTION, SOLUTION EPIDURAL; INFILTRATION; INTRACAUDAL; PERINEURAL AS NEEDED
Status: DISCONTINUED | OUTPATIENT
Start: 2020-01-13 | End: 2020-01-13 | Stop reason: SURG

## 2020-01-13 RX ORDER — HYDROCODONE BITARTRATE AND ACETAMINOPHEN 5; 325 MG/1; MG/1
1 TABLET ORAL EVERY 6 HOURS PRN
Status: DISCONTINUED | OUTPATIENT
Start: 2020-01-13 | End: 2020-01-13 | Stop reason: HOSPADM

## 2020-01-13 RX ORDER — KETOROLAC TROMETHAMINE 30 MG/ML
INJECTION, SOLUTION INTRAMUSCULAR; INTRAVENOUS AS NEEDED
Status: DISCONTINUED | OUTPATIENT
Start: 2020-01-13 | End: 2020-01-13 | Stop reason: SURG

## 2020-01-13 RX ORDER — MIDAZOLAM HYDROCHLORIDE 2 MG/2ML
INJECTION, SOLUTION INTRAMUSCULAR; INTRAVENOUS AS NEEDED
Status: DISCONTINUED | OUTPATIENT
Start: 2020-01-13 | End: 2020-01-13 | Stop reason: SURG

## 2020-01-13 RX ADMIN — FENTANYL CITRATE 50 MCG: 50 INJECTION, SOLUTION INTRAMUSCULAR; INTRAVENOUS at 08:12

## 2020-01-13 RX ADMIN — ONDANSETRON 4 MG: 2 INJECTION INTRAMUSCULAR; INTRAVENOUS at 07:40

## 2020-01-13 RX ADMIN — KETOROLAC TROMETHAMINE 30 MG: 30 INJECTION, SOLUTION INTRAMUSCULAR at 08:54

## 2020-01-13 RX ADMIN — FENTANYL CITRATE 25 MCG: 50 INJECTION, SOLUTION INTRAMUSCULAR; INTRAVENOUS at 09:11

## 2020-01-13 RX ADMIN — FENTANYL CITRATE 50 MCG: 50 INJECTION, SOLUTION INTRAMUSCULAR; INTRAVENOUS at 07:45

## 2020-01-13 RX ADMIN — FENTANYL CITRATE 25 MCG: 50 INJECTION, SOLUTION INTRAMUSCULAR; INTRAVENOUS at 09:08

## 2020-01-13 RX ADMIN — MIDAZOLAM HYDROCHLORIDE 2 MG: 1 INJECTION, SOLUTION INTRAMUSCULAR; INTRAVENOUS at 07:28

## 2020-01-13 RX ADMIN — LIDOCAINE HYDROCHLORIDE 90 MG: 10 INJECTION, SOLUTION EPIDURAL; INFILTRATION; INTRACAUDAL; PERINEURAL at 07:35

## 2020-01-13 RX ADMIN — HYDROCODONE BITARTRATE AND ACETAMINOPHEN 1 TABLET: 5; 325 TABLET ORAL at 10:01

## 2020-01-13 RX ADMIN — DEXAMETHASONE SODIUM PHOSPHATE 8 MG: 10 INJECTION, SOLUTION INTRAMUSCULAR; INTRAVENOUS at 07:40

## 2020-01-13 RX ADMIN — PROPOFOL 200 MG: 10 INJECTION, EMULSION INTRAVENOUS at 07:35

## 2020-01-13 RX ADMIN — SODIUM CHLORIDE, SODIUM LACTATE, POTASSIUM CHLORIDE, AND CALCIUM CHLORIDE: .6; .31; .03; .02 INJECTION, SOLUTION INTRAVENOUS at 07:00

## 2020-01-13 RX ADMIN — CEFAZOLIN SODIUM 2000 MG: 1 INJECTION, POWDER, FOR SOLUTION INTRAMUSCULAR; INTRAVENOUS at 07:50

## 2020-01-13 RX ADMIN — FENTANYL CITRATE 25 MCG: 50 INJECTION, SOLUTION INTRAMUSCULAR; INTRAVENOUS at 09:16

## 2020-01-13 NOTE — DISCHARGE INSTRUCTIONS
DISCHARGE INSTRUCTIONS   VARICOSE VEIN SURGERY    1) When released from the hospital, you should have a compression bandage in place on the operated leg  This bandage should feel snug but not too tight  If the bandage becomes blood soaked or painfully tight, elevate your leg and call your surgeon immediately  2) If the operated leg becomes increasingly painful or swollen, or if there is increasing redness or pain around your incisions, contact our office  3) On the day of your operation, take it easy and elevate your leg as much as possible  You can take short walks around the house  When sitting, the leg should be elevated  The preferred position is to have the leg at or above the level of the heart  Starting on the first day after surgery, light walking is strongly encouraged as tolerated  After your ultrasound test, you can resume your normal activity, but no heavy lifting or strenuous exercise for 2 weeks  4) Some bruising and redness of the skin is common after varicose vein surgery  This can be lessened by strict elevation of the leg  Many patients will notice some numbness of the shin, ankle, calf, or the top of the foot  This usually fades with time, but may be persistent  After surgery you can expect bruising, swelling and hard knots on your leg  As your body heals the bruising will fade and the swelling and knots will subside  5) Keep your operative dressings on for till your scheduled followup  However if the bandages feel too tight before the office visit then  you can remove all bandages  Your incisions are covered with a surgical glue which will wear away in 1-2 weeks  Start wearing your compression stockings after the bandage is removed  You can use the ACE wraps instead if your leg is too swollen for the compression stocking  Observe incisions daily  Report to our office any of the following:  a) Any areas that are red and angry in appearance    b) Any drainage that is milky or cloudy in appearance or that has a foul odor  c) Elevated temperature of 100 5 degrees F or greater  6) Apply sunscreen with SPF 30 to incisions while sun bathing for up to one year after surgery to reduce the chances of your incisions darkening  7)        Your first post-operative appointment will be 2 to 3 days after your surgery  At this appointment, you will have an ultrasound  You will follow-up with                    your surgeon ~2 weeks after your procedure  8)         If have any questions, please call our office at [phone]  [phone] [facility] FREE [phone]  [address] [place] [address] , [address] [place], [place] [facility] [place], [address]  [facility] 99, [place] R, [address]  [address], [facility], [address]  [address] [facility], [address]  [address], [address]st [address], [address]  [facility] 19, [address] [facility], [address] [facility] 97   [address] [facility], [address], [place] [facility] [place], [address] [facility]  [facility], [address] [facility], [facility], [address], [address]

## 2020-01-13 NOTE — ANESTHESIA PREPROCEDURE EVALUATION
Review of Systems/Medical History  Patient summary reviewed  Chart reviewed  No history of anesthetic complications     Cardiovascular  Negative cardio ROS Exercise tolerance (METS): >4,     Pulmonary  Negative pulmonary ROS Not a smoker , No recent URI , No sleep apnea ,        GI/Hepatic    No GERD , Bariatric surgery,        Negative  ROS        Endo/Other    Obesity (BMI 37)    GYN  Negative gynecology ROS Not currently pregnant ,          Hematology  Negative hematology ROS      Musculoskeletal  Negative musculoskeletal ROS        Neurology  Negative neurology ROS      Psychology   Negative psychology ROS            Physical Exam    Airway    Mallampati score: II  TM Distance: >3 FB  Neck ROM: full     Dental   No notable dental hx     Cardiovascular  Comment: Negative ROS,     Pulmonary      Other Findings      Lab Results   Component Value Date    SODIUM 137 08/29/2019    K 4 1 08/29/2019    BUN 16 08/29/2019    CREATININE 0 70 08/29/2019    EGFR 106 08/29/2019     Anesthesia Plan  ASA Score- 2     Anesthesia Type- general with ASA Monitors  Additional Monitors:   Airway Plan: LMA  Plan Factors-    Induction- intravenous  Postoperative Plan-     Informed Consent- Anesthetic plan and risks discussed with patient and spouse  I personally reviewed this patient with the CRNA  Discussed and agreed on the Anesthesia Plan with the CRNA  Radha Chicas

## 2020-01-13 NOTE — OP NOTE
OPERATIVE REPORT  PATIENT NAME: Liam Max    :  1975  MRN: [id number]  Pt Location: [facility] OR ROOM 05    SURGERY DATE: 2020    Surgeon(s) and Role:     Annabelle Jules MD - Primary    Preop Diagnosis:  Varicose veins of bilateral lower extremities with pain [I83 813]    Post-Op Diagnosis Codes: * Varicose veins of bilateral lower extremities with pain [I83 813]    Procedure(s) (LRB):  ENDOVASCULAR LASER THERAPY (EVLT) (Right)  Extensive stab phlebectomy x 27     Specimen(s):  * No specimens in log *    Estimated Blood Loss:   Minimal    Drains:  * No LDAs found *    Anesthesia Type:   General    Operative Indications:  Varicose veins of bilateral lower extremities with pain [I83 813]      Operative Findings:  Successful laser ablation of the right greater saphenous vein    Complications:   None    Procedure and Technique: In the preoperative holding area the patient was examined in standing position and superficial varicosities were marked  Patient was brought to the operating room placed in the supine position and general anesthesia was induced via endotracheal intubation  Intravenous antibiotic prophylaxis was administered  Ultrasound was performed and the greater saphenous vein was identified and marked in the medial thigh  Patient's entire    right leg was then prepped and draped in the usual standard sterile fashion  Using ultrasound guidance and micropuncture technique the greater saphenous vein was accessed in the distal thigh  Microwire was advanced  Skin incision was made and the micro-sheath was inserted into the greater saphenous vein  Next a guidewire was inserted under ultrasound guidance into the greater saphenous vein up to the saphenofemoral junction  Then the introducer sheath was advanced up to 2 5 cm away from the saphenofemoral junction  This was documented by ultrasound and marked  Then the laser fiber was inserted into the sheath and locked in position  Positioning of the tip of the laser was again confirmed with ultrasound and it was confirmed to be 2 5 cm away from the saphenofemoral junction  A mesenteric anesthesia was infiltrated circumferentially around the greater saphenous vein  Then endovenous laser ablation was started using 7 W of energy  Slow pullback was carried out at 10 mm/ 7 s and under ultrasound guidance the length of the subfascial greater saphenous vein was ablated  Following which the sheath was removed and pressure was held over the access site for 5 minutes  Completion duplex was performed which demonstrated closure of the greater saphenous vein  The common femoral vein was patent without evidence of thrombosis  There was good augmentation of the common femoral vein flow upon compression of the calf muscles  Hemostasis was achieved by compression  Multiple stab incisions (total 27) were made using 11 blade over the previously marked varicosities  Using mosquito forceps and  Stab phlebectomy hooks we removed these varicosities  Manual pressure was held to achieve hemostasis over the stab incisions  Surgical glue was applied over the stab incisions  The leg was wrapped in a multilayered compression bandage with web roll, Ace wrap  Patient was awakened from general anesthesia and transferred to recovery room in a stable fashion  In the end of the case instrument sponge and needle counts were found to be correct      I was present for the entire procedure  A qualified resident physician was not available     I was present for the entire procedure and A qualified resident physician was not available    Patient Disposition:  PACU     SIGNATURE: Yaron Gan MD  DATE: January 13, 2020  TIME: 11:33 AM

## 2020-01-13 NOTE — H&P
Varicose veins of bilateral lower extremities with pain  Reviewed the pathophysiology of varicose veins and the results of the reflux study with her  Bilateral greater saphenous vein reflux  Patient will benefit from endovenous laser ablation and stab phlebectomy  Risks of the procedure such as deep vein thrombosis and recurrence and bruising were discussed  She understands and agrees to proceed  Plan for one leg at a time  Plan for right leg today     Upon close review of the venous duplex imaging, there is a typographical error, the left greater saphenous vein at the inguinal region is measuring 9 mm and not 0 9 mm as stated in the report                     Subjective:       Patient ID: Liza Nunes is a 40 y o  female            HPI  Patient is here for procedure      The following portions of the patient's history were reviewed and updated as appropriate: allergies, current medications, past family history, past medical history, past social history, past surgical history and problem list      Review of Systems   Constitutional: Negative  HENT: Negative  Eyes: Negative  Respiratory: Negative  Cardiovascular:        Painful veins   Gastrointestinal: Negative  Endocrine: Negative  Genitourinary: Negative  Musculoskeletal:        Leg pain   Skin: Negative  Allergic/Immunologic: Negative  Neurological: Negative  Hematological: Negative  Psychiatric/Behavioral: Negative  I have reviewed the review of systems as entered and made appropriate changes as necessary     Objective:     /70   Pulse 83   Temp 98 4 °F (36 9 °C) (Temporal)   Resp 16   Ht 5' 5" (1 651 m)   Wt 101 kg (222 lb)   SpO2 99%   BMI 36 94 kg/m²               Physical Exam   Constitutional: She is oriented to person, place, and time  She appears well-developed and well-nourished  Cardiovascular: Normal rate and intact distal pulses   Heart sounds normal  Pulm: Lungs clear to auscultation bilateral  Neurological: She is alert and oriented to person, place, and time  Skin: Skin is warm and dry  No erythema  No pallor  Multiple large varicose veins, truncal varicosities scattered along the saphenous vein distribution bilaterally  There are also some reticular veins as well     Nursing note and vitals reviewed

## 2020-01-13 NOTE — ANESTHESIA POSTPROCEDURE EVALUATION
Post-Op Assessment Note    CV Status:  Stable  Pain Score: 0    Pain management: adequate     Mental Status:  Sleepy   Hydration Status:  Euvolemic   PONV Controlled:  Controlled   Airway Patency:  Patent   Post Op Vitals Reviewed: Yes      Staff: CRNA   Comments: vss sv nonobstructed uneventful          BP   121/59   Temp   98 2   Pulse 90   Resp 24   SpO2 98

## 2020-01-15 ENCOUNTER — OFFICE VISIT (OUTPATIENT)
Dept: VASCULAR SURGERY | Facility: CLINIC | Age: 45
End: 2020-01-15

## 2020-01-15 ENCOUNTER — HOSPITAL ENCOUNTER (OUTPATIENT)
Dept: NON INVASIVE DIAGNOSTICS | Facility: CLINIC | Age: 45
Discharge: HOME | End: 2020-01-15
Payer: COMMERCIAL

## 2020-01-15 VITALS
WEIGHT: 220 LBS | DIASTOLIC BLOOD PRESSURE: 58 MMHG | SYSTOLIC BLOOD PRESSURE: 132 MMHG | HEART RATE: 68 BPM | BODY MASS INDEX: 36.65 KG/M2 | HEIGHT: 65 IN

## 2020-01-15 DIAGNOSIS — I83.813 VARICOSE VEINS OF BILATERAL LOWER EXTREMITIES WITH PAIN: Primary | ICD-10-CM

## 2020-01-15 DIAGNOSIS — I83.813 VARICOSE VEINS OF BILATERAL LOWER EXTREMITIES WITH PAIN: ICD-10-CM

## 2020-01-15 DIAGNOSIS — I82.4Z1 ACUTE DEEP VEIN THROMBOSIS (DVT) OF DISTAL END OF RIGHT LOWER EXTREMITY (HCC): ICD-10-CM

## 2020-01-15 PROCEDURE — 99024 POSTOP FOLLOW-UP VISIT: CPT | Performed by: NURSE PRACTITIONER

## 2020-01-15 PROCEDURE — 93971 EXTREMITY STUDY: CPT

## 2020-01-15 RX ORDER — CHLORHEXIDINE GLUCONATE 0.12 MG/ML
15 RINSE ORAL ONCE
Status: CANCELLED | OUTPATIENT
Start: 2020-01-15 | End: 2020-01-15

## 2020-01-15 NOTE — PROGRESS NOTES
Assessment/Plan:    Varicose veins of bilateral lower extremities with pain  Symptomatic varicose veins bilateral lower extremities with pain, now s/p R GSV EVLT and phlebectomies x27 on 1/13/2020 by Dr Hue Arias  Vasovagaled when postop compression dressing removed  Advance activities as tolerated  Postop duplex +R right calf- PT, peroneal, soleal DVT  Patient initiated on 6 weeks of Xarelto 20mg daily  She will have a repeat venous duplex prior to 6 week followup  Return to use of compression stockings when comfortably able to do so  Continues to be symptomatic on left despite conservative measures  Will hold on proceeding with L EVLT until after completion of 6 weeks AC  Followup after repeat duplex  Diagnoses and all orders for this visit:    Varicose veins of bilateral lower extremities with pain  -     Case request operating room: ENDOVASCULAR LASER THERAPY (EVLT) and phlebectomies; Standing  -     Case request operating room: ENDOVASCULAR LASER THERAPY (EVLT) and phlebectomies    Acute deep vein thrombosis (DVT) of distal end of right lower extremity (HCC)  -     VAS lower limb venous duplex study, unilateral/limited; Future    Other orders  -     Diet NPO; Sips with meds; Standing  -     Void on call to OR; Standing  -     Insert peripheral IV; Standing  -     Nursing Communication Use 2 CHG cloths, have the patient wash his/her surgical site or have staff wash the site if patient is unable to; Standing  -     Nursing Communication Swab both nares with Povidone-Iodine solution, EXCLUDE if patient has shellfish/Iodine allergy; Standing  -     chlorhexidine (PERIDEX) 0 12 % oral rinse 15 mL  -     Place sequential compression device; Standing          Subjective:      Patient ID: Nils Loyola is a 39 y o  female  Symptomatic varicose veins to bilateral lower extremities with pain, now status post right GSV EVLT and phlebectomies x27 on 1/13/2020 by Dr Hue Arias    She vasovagaled when postop compression dressing removed  Became dizzy and nauseous; felt better after a few minutes of rest and lying flat  She complains of tenderness to the right leg  Denies chest pain/shortness of breath  Continues with painful varicosities to the left leg  Pt is here PO R EVLT 1-13-20  Pt C/o of dizziness & tenderness in R leg  Pt C/o of swelling, bruising in the R leg  Pt  Denies having fever or chills  The following portions of the patient's history were reviewed and updated as appropriate: allergies, current medications, past family history, past medical history, past social history, past surgical history and problem list     Review of Systems   Constitutional: Negative  HENT: Negative  Eyes: Negative  Respiratory: Negative  Cardiovascular: Positive for leg swelling  Gastrointestinal: Negative  Endocrine: Negative  Genitourinary: Negative  Musculoskeletal:        Tenderness in R leg   Skin: Positive for color change (bruising in R leg) and wound (27 small incision sites )  Allergic/Immunologic: Negative  Neurological: Positive for dizziness  Hematological: Negative  Psychiatric/Behavioral: Negative  Objective:     Physical Exam   Constitutional: She is oriented to person, place, and time  No distress  Pulmonary/Chest: Effort normal  No respiratory distress  Musculoskeletal: She exhibits edema (RLE edema)  Neurological: She is alert and oriented to person, place, and time  Skin:   Multiple phlebectomy sites with bruising, ecchymosis to medial thigh   Psychiatric: She has a normal mood and affect  I have reviewed and made appropriate changes to the review of systems input by the medical assistant      Vitals:    01/15/20 1257   BP: 132/58   BP Location: Left arm   Patient Position: Sitting   Pulse: 68   Weight: 99 8 kg (220 lb)   Height: 5' 5" (1 651 m)       Patient Active Problem List   Diagnosis    Varicose veins of bilateral lower extremities with pain    Ankle instability    Ankle pain       Past Surgical History:   Procedure Laterality Date    GASTRIC BYPASS  2004    10 years ago    NH ENDOVENOUS LASER, 1ST VEIN Right 1/13/2020    Procedure: ENDOVASCULAR LASER THERAPY (EVLT);   Surgeon: Shirin Hernandez MD;  Location: [facility];  Service: Vascular       Family History   Problem Relation Age of Onset    Asthma Mother     No Known Problems Father        Social History     Socioeconomic History    Marital status: /Civil Union     Spouse name: Not on file    Number of children: Not on file    Years of education: Not on file    Highest education level: Not on file   Occupational History    Not on file   Social Needs    Financial resource strain: Not on file    Food insecurity:     Worry: Not on file     Inability: Not on file    Transportation needs:     Medical: Not on file     Non-medical: Not on file   Tobacco Use    Smoking status: Never Smoker    Smokeless tobacco: Never Used   Substance and Sexual Activity    Alcohol use: Yes     Frequency: Monthly or less     Drinks per session: 1 or 2     Binge frequency: Less than monthly    Drug use: No    Sexual activity: Yes   Lifestyle    Physical activity:     Days per week: Not on file     Minutes per session: Not on file    Stress: Not on file   Relationships    Social connections:     Talks on phone: Not on file     Gets together: Not on file     Attends Christian service: Not on file     Active member of club or organization: Not on file     Attends meetings of clubs or organizations: Not on file     Relationship status: Not on file    Intimate partner violence:     Fear of current or ex partner: Not on file     Emotionally abused: Not on file     Physically abused: Not on file     Forced sexual activity: Not on file   Other Topics Concern    Not on file   Social History Narrative    Not on file       No Known Allergies      Current Outpatient Medications:    cyanocobalamin (VITAMIN B-12) 100 mcg tablet, Take by mouth daily, Disp: , Rfl:     HYDROcodone-acetaminophen (NORCO) 5-325 mg per tablet, Take 1 tablet by mouth every 6 (six) hours as needed for pain (severe) for up to 10 daysMax Daily Amount: 4 tablets, Disp: 10 tablet, Rfl: 0    meloxicam (MOBIC) 7 5 mg tablet, Take 1 tablet (7 5 mg total) by mouth 2 (two) times a day, Disp: 60 tablet, Rfl: 2    multivitamin (THERAGRAN) TABS, Take 1 tablet by mouth, Disp: , Rfl:     VIORELE 0 15-0 02/0 01 MG (21/5) per tablet, Take 1 tablet by mouth daily, Disp: , Rfl: 4

## 2020-01-15 NOTE — PATIENT INSTRUCTIONS
Symptomatic varicose veins to both legs with pain  Status post right leg venous ablation and multiple phlebectomies on 1/13/2020 by Dr Bhagat Record  -advance your activities as tolerated  Start low and go slow, listen to your body, if you have pain scale back your activities  -you can wash the leg with regular soap and water  Pat dry and leave clean, dry and open to air  Do not apply any Neosporin or triple antibiotic ointment to the puncture sites  Once new skin has formed over all puncture sites you can apply moisturizing lotion and take tub baths  -return to use of your compression stockings when you are comfortably able to do so  -we will proceed with scheduling treatment of the left leg  Our surgery schedulers will reach out you with further information/time and date    Your postop ultrasound showed a blood clot in the calf vein  You will need to take Xarelto 20mg once daily x6 weeks and then we will repeat a venous ultrasound to assess resolution of the blood clot    We will hold on scheduling left leg surgery until you have completed 6 weeks of Xarelto/blood thinning medication

## 2020-01-15 NOTE — ASSESSMENT & PLAN NOTE
Symptomatic varicose veins bilateral lower extremities with pain, now s/p R GSV EVLT and phlebectomies x27 on 1/13/2020 by Dr Marisol Gee  Vasovagaled when postop compression dressing removed  Advance activities as tolerated  Postop duplex +R right calf- PT, peroneal, soleal DVT  Patient initiated on 6 weeks of Xarelto 20mg daily  She will have a repeat venous duplex prior to 6 week followup  Return to use of compression stockings when comfortably able to do so  Continues to be symptomatic on left despite conservative measures  Will hold on proceeding with L EVLT until after completion of 6 weeks AC  Followup after repeat duplex

## 2020-01-16 PROCEDURE — 93971 EXTREMITY STUDY: CPT | Performed by: SURGERY

## 2020-03-03 ENCOUNTER — OFFICE VISIT (OUTPATIENT)
Dept: VASCULAR SURGERY | Facility: CLINIC | Age: 45
End: 2020-03-03

## 2020-03-03 ENCOUNTER — HOSPITAL ENCOUNTER (OUTPATIENT)
Dept: NON INVASIVE DIAGNOSTICS | Facility: CLINIC | Age: 45
Discharge: HOME | End: 2020-03-03
Payer: COMMERCIAL

## 2020-03-03 VITALS
SYSTOLIC BLOOD PRESSURE: 132 MMHG | HEIGHT: 64 IN | RESPIRATION RATE: 18 BRPM | WEIGHT: 212 LBS | DIASTOLIC BLOOD PRESSURE: 84 MMHG | HEART RATE: 103 BPM | BODY MASS INDEX: 36.19 KG/M2 | TEMPERATURE: 99.4 F

## 2020-03-03 DIAGNOSIS — I82.4Z1 ACUTE DEEP VEIN THROMBOSIS (DVT) OF DISTAL END OF RIGHT LOWER EXTREMITY (HCC): ICD-10-CM

## 2020-03-03 DIAGNOSIS — I83.813 VARICOSE VEINS OF BILATERAL LOWER EXTREMITIES WITH PAIN: Primary | ICD-10-CM

## 2020-03-03 PROCEDURE — 3008F BODY MASS INDEX DOCD: CPT | Performed by: SURGERY

## 2020-03-03 PROCEDURE — 93971 EXTREMITY STUDY: CPT | Performed by: SURGERY

## 2020-03-03 PROCEDURE — 93971 EXTREMITY STUDY: CPT

## 2020-03-03 PROCEDURE — 99024 POSTOP FOLLOW-UP VISIT: CPT | Performed by: SURGERY

## 2020-03-03 NOTE — PROGRESS NOTES
Assessment/Plan:    Varicose veins of bilateral lower extremities with pain  Patient underwent right leg endovenous laser ablation and stab phlebectomy a few weeks ago a  Postoperatively she had calf vein DVT requiring 6 week therapy with Xarelto with complete resolution  Will plan for the left leg treatment in near future  Plan for left leg endovenous laser ablation and stab phlebectomy  Risks of DVT and recurrence were discussed  Diagnoses and all orders for this visit:    Varicose veins of bilateral lower extremities with pain          Subjective:      Patient ID: Nessa Wellington is a 39 y o  female  HPI  Patient presents for follow-up for right leg endovenous laser ablation  She had extensive stab phlebectomies and postprocedure had a calf vein DVT that has now resolved with 6 weeks of Xarelto  Her leg feels significantly better compared to before the surgery  She denies any pain numbness tingling swelling or burning  She is actively exercising and has lost 10 lb  She would like to get other leg treated as she has symptoms on the left leg and painful varicose veins  The following portions of the patient's history were reviewed and updated as appropriate: allergies, current medications, past family history, past medical history, past social history, past surgical history and problem list     Review of Systems   Constitutional: Negative  Negative for chills and fever  HENT: Negative  Eyes: Negative  Respiratory: Negative  Cardiovascular: Negative  Negative for leg swelling  Gastrointestinal: Negative  Endocrine: Negative  Genitourinary: Negative  Musculoskeletal: Negative  Skin: Negative  Negative for wound  Allergic/Immunologic: Negative  Neurological: Negative  Hematological: Negative  Psychiatric/Behavioral: Negative        I have reviewed the review of systems as entered and made appropriate changes as necessary    Objective:      /84 (BP Location: Left arm, Patient Position: Sitting, Cuff Size: Adult)   Pulse 103   Temp 99 4 °F (37 4 °C) (Tympanic)   Resp 18   Ht 5' 4" (1 626 m)   Wt 96 2 kg (212 lb)   BMI 36 39 kg/m²          Physical Exam    Left leg scattered varicose veins in the truncal distribution especially behind the knee  Right leg incisions for stab phlebectomy of healed well

## 2020-03-03 NOTE — ASSESSMENT & PLAN NOTE
Patient underwent right leg endovenous laser ablation and stab phlebectomy a few weeks ago a  Postoperatively she had calf vein DVT requiring 6 week therapy with Xarelto with complete resolution  Will plan for the left leg treatment in near future  Plan for left leg endovenous laser ablation and stab phlebectomy  Risks of DVT and recurrence were discussed

## 2020-03-04 ENCOUNTER — PREP FOR PROCEDURE (OUTPATIENT)
Dept: VASCULAR SURGERY | Facility: CLINIC | Age: 45
End: 2020-03-04

## 2020-03-04 ENCOUNTER — TELEPHONE (OUTPATIENT)
Dept: VASCULAR SURGERY | Facility: CLINIC | Age: 45
End: 2020-03-04

## 2020-03-04 DIAGNOSIS — I83.813 VARICOSE VEINS OF BILATERAL LOWER EXTREMITIES WITH PAIN: Primary | ICD-10-CM

## 2020-03-04 NOTE — TELEPHONE ENCOUNTER
Lm for pt to return my call to schedule her L EVLT w/stab phlebs with Dr Edward Vera  I offered her 3/13/20 in [facility]

## 2020-03-04 NOTE — TELEPHONE ENCOUNTER
Received call back from pt  She would like to book after 3/26/20  I offered her 4/9/20 in [facility] or 4/10/20 in [facility]  She chose 4/10/20 in [facility]  She is aware nothing to eat or drink after midnight on 4/9/20  She is aware no blood work is required for [facility]  No hold on any medications  Surgery scheduling form scanned to prior auth department to determine if prior auth is needed

## 2020-04-20 ENCOUNTER — TELEPHONE (OUTPATIENT)
Dept: FAMILY MEDICINE CLINIC | Facility: CLINIC | Age: 45
End: 2020-04-20

## 2020-07-01 ENCOUNTER — TELEPHONE (OUTPATIENT)
Dept: VASCULAR SURGERY | Facility: CLINIC | Age: 45
End: 2020-07-01

## 2020-07-01 NOTE — TELEPHONE ENCOUNTER
Patient called back and she cannot do 7-10-20 and she has chosen the date of 8-14-20 at [facility]  Told patient that I will call her back with the preop information   LL[facility]

## 2020-07-01 NOTE — TELEPHONE ENCOUNTER
Cornel Alexander notified writer patient received a letter from [employer] about her EVLT procedure performed on 01/13/20  Writer informed Leigh Loud they would review the case and call the patient  Writer reviewed the case  Per documentation, no authorization was required for patient's procedure  Writer spoke with the patient regarding the letter received from [employer]  Patient read the letter to writer  Writer requested that the letter be emailed over, Dave [email or web address]  Patient sent the e-mail while Josse Medina was on the phone  No e-mail was received  Writer informed patient that if the e-mail does not come through they will call the patient back  Letter has been received

## 2020-07-01 NOTE — TELEPHONE ENCOUNTER
Left message for patient to call us back to schedule surgery want to offer her 7-10-20 at [facility] or [facility] 8-3-20 L[facility]

## 2020-07-21 ENCOUNTER — PREP FOR PROCEDURE (OUTPATIENT)
Dept: VASCULAR SURGERY | Facility: CLINIC | Age: 45
End: 2020-07-21

## 2020-07-21 ENCOUNTER — TELEPHONE (OUTPATIENT)
Dept: VASCULAR SURGERY | Facility: CLINIC | Age: 45
End: 2020-07-21

## 2020-07-21 DIAGNOSIS — Z11.59 SCREENING FOR VIRAL DISEASE: ICD-10-CM

## 2020-07-21 DIAGNOSIS — I83.813 VARICOSE VEINS OF BOTH LOWER EXTREMITIES WITH PAIN: Primary | ICD-10-CM

## 2020-07-21 NOTE — TELEPHONE ENCOUNTER
Surgery Date: 8-14-20  Surgeon(s): Suly Mcfadden (NPI: [id number])  Facility: [facility] (Tax: [id number] / NPI: [id number])  Inpatient / Outpatient: Outpatient  Level: 4    Clearance Received: No clearance ordered  Consent Received: Yes, scanned into Epic on 7-21-20  Medication Hold / Last Dose: no  VQI Spreadsheet: Not Applicable (N/A)  IR Notified: Not Applicable (N/A)  Rep  Notified: Not Applicable (N/A)  Vas Lab Requested: yes  Patient Contacted: 7-21-20    Diagnosis: [id number]  Procedure/ CPT Code(s): 83189 53644    Is this an EVLT Case? Yes, (L) EVLT (CPT: 53980) w/ stab phlebectomies     Is patient requesting a call when authorization has been obtained? Patient did not request a call  Post Operative Date/ Time: To Be Determined (TBD)     *Please review with patient med hold, PATs, and check H&P *  PATIENT WAS MAILED SURGERY/SHOWERING/DISCHARGE/COVID INSTRUCTIONS AFTER REVIEWING WITH HER VIA PHONE CALL  Spoke to patient to let her know nothing to eat or drink after midnight on 8-13-20 patient was told to have covid testing done before surgery Patient cofirmed and understood the instructions   [facility]

## 2020-07-21 NOTE — TELEPHONE ENCOUNTER
Patient called to see if there is any update on the letter she received from [address] [place] [address]  Writer informed patient that she was unable to find any answer to why the letter was sent to her  Writer recommended she reach out to Michael [address], [phone]  Writer informed patient that they are unable to pull up any information that shows the claim was paid  Writer encouraged patient to call back should she have any additional questions or concerns, [phone]

## 2020-07-30 NOTE — TELEPHONE ENCOUNTER
Per [facility] [facility] requires clinical review prior to receiving a determination  Please refer to Brooke Crouch / Deidra Flores number [phone] for case updates

## 2020-08-03 ENCOUNTER — ANESTHESIA EVENT (OUTPATIENT)
Dept: PERIOP | Facility: AMBULARY SURGERY CENTER | Age: 45
End: 2020-08-03
Payer: COMMERCIAL

## 2020-08-03 DIAGNOSIS — Z11.59 SCREENING FOR VIRAL DISEASE: ICD-10-CM

## 2020-08-03 PROCEDURE — U0003 INFECTIOUS AGENT DETECTION BY NUCLEIC ACID (DNA OR RNA); SEVERE ACUTE RESPIRATORY SYNDROME CORONAVIRUS 2 (SARS-COV-2) (CORONAVIRUS DISEASE [COVID-19]), AMPLIFIED PROBE TECHNIQUE, MAKING USE OF HIGH THROUGHPUT TECHNOLOGIES AS DESCRIBED BY CMS-2020-01-R: HCPCS

## 2020-08-05 LAB — SARS-COV-2 RNA SPEC QL NAA+PROBE: NOT DETECTED

## 2020-08-12 NOTE — PRE-PROCEDURE INSTRUCTIONS
Pre-Surgery Instructions:   Medication Instructions    ascorbic acid (VITAMIN C) 500 mg tablet Instructed patient per Anesthesia Guidelines   cyanocobalamin (VITAMIN B-12) 100 mcg tablet Instructed patient per Anesthesia Guidelines   EVENING PRIMROSE OIL PO Instructed patient per Anesthesia Guidelines   multivitamin (THERAGRAN) TABS Instructed patient per Anesthesia Guidelines   Psyllium (METAMUCIL PO) Instructed patient per Anesthesia Guidelines   TURMERIC PO Instructed patient per Anesthesia Guidelines      Pre op,medications and showering instructions reviewed-Patient has hibiclens

## 2020-08-13 RX ORDER — FENTANYL CITRATE/PF 50 MCG/ML
25 SYRINGE (ML) INJECTION
Status: CANCELLED | OUTPATIENT
Start: 2020-08-13

## 2020-08-13 RX ORDER — LABETALOL 20 MG/4 ML (5 MG/ML) INTRAVENOUS SYRINGE
10
Status: CANCELLED | OUTPATIENT
Start: 2020-08-13

## 2020-08-13 RX ORDER — METOCLOPRAMIDE HYDROCHLORIDE 5 MG/ML
10 INJECTION INTRAMUSCULAR; INTRAVENOUS ONCE AS NEEDED
Status: CANCELLED | OUTPATIENT
Start: 2020-08-13

## 2020-08-13 RX ORDER — ONDANSETRON 2 MG/ML
4 INJECTION INTRAMUSCULAR; INTRAVENOUS ONCE AS NEEDED
Status: CANCELLED | OUTPATIENT
Start: 2020-08-13

## 2020-08-13 RX ORDER — PROMETHAZINE HYDROCHLORIDE 25 MG/ML
12.5 INJECTION, SOLUTION INTRAMUSCULAR; INTRAVENOUS ONCE AS NEEDED
Status: CANCELLED | OUTPATIENT
Start: 2020-08-13

## 2020-08-13 RX ORDER — HYDROMORPHONE HCL/PF 1 MG/ML
0.5 SYRINGE (ML) INJECTION
Status: CANCELLED | OUTPATIENT
Start: 2020-08-13

## 2020-08-13 RX ORDER — ALBUTEROL SULFATE 2.5 MG/3ML
2.5 SOLUTION RESPIRATORY (INHALATION) ONCE AS NEEDED
Status: CANCELLED | OUTPATIENT
Start: 2020-08-13

## 2020-08-13 RX ORDER — HYDROMORPHONE HCL/PF 1 MG/ML
0.2 SYRINGE (ML) INJECTION
Status: CANCELLED | OUTPATIENT
Start: 2020-08-13

## 2020-08-13 RX ORDER — HYDRALAZINE HYDROCHLORIDE 20 MG/ML
5 INJECTION INTRAMUSCULAR; INTRAVENOUS
Status: CANCELLED | OUTPATIENT
Start: 2020-08-13

## 2020-08-14 ENCOUNTER — TELEPHONE (OUTPATIENT)
Dept: ADMINISTRATIVE | Facility: HOSPITAL | Age: 45
End: 2020-08-14

## 2020-08-14 ENCOUNTER — ANESTHESIA (OUTPATIENT)
Dept: PERIOP | Facility: AMBULARY SURGERY CENTER | Age: 45
End: 2020-08-14
Payer: COMMERCIAL

## 2020-08-14 ENCOUNTER — HOSPITAL ENCOUNTER (OUTPATIENT)
Facility: AMBULARY SURGERY CENTER | Age: 45
Setting detail: OUTPATIENT SURGERY
Discharge: HOME | End: 2020-08-14
Attending: SURGERY | Admitting: SURGERY
Payer: COMMERCIAL

## 2020-08-14 ENCOUNTER — HOSPITAL ENCOUNTER (OUTPATIENT)
Dept: ULTRASOUND IMAGING | Facility: AMBULARY SURGERY CENTER | Age: 45
Discharge: HOME | End: 2020-08-14
Payer: COMMERCIAL

## 2020-08-14 VITALS
OXYGEN SATURATION: 100 % | HEIGHT: 65 IN | HEART RATE: 77 BPM | DIASTOLIC BLOOD PRESSURE: 76 MMHG | BODY MASS INDEX: 34.99 KG/M2 | RESPIRATION RATE: 16 BRPM | WEIGHT: 210 LBS | TEMPERATURE: 97.3 F | SYSTOLIC BLOOD PRESSURE: 125 MMHG

## 2020-08-14 DIAGNOSIS — I83.899 VARICOSE VEINS OF LOWER EXTREMITIES WITH COMPLICATIONS: ICD-10-CM

## 2020-08-14 DIAGNOSIS — I83.813 VARICOSE VEINS OF BILATERAL LOWER EXTREMITIES WITH PAIN: Primary | ICD-10-CM

## 2020-08-14 LAB
EXT PREGNANCY TEST URINE: NEGATIVE
EXT. CONTROL: NORMAL

## 2020-08-14 PROCEDURE — 81025 URINE PREGNANCY TEST: CPT | Performed by: SURGERY

## 2020-08-14 PROCEDURE — 37766 PHLEB VEINS - EXTREM 20+: CPT | Performed by: SURGERY

## 2020-08-14 PROCEDURE — NC001 PR NO CHARGE: Performed by: SURGERY

## 2020-08-14 PROCEDURE — 36478 ENDOVENOUS LASER 1ST VEIN: CPT | Performed by: SURGERY

## 2020-08-14 PROCEDURE — 99024 POSTOP FOLLOW-UP VISIT: CPT | Performed by: SURGERY

## 2020-08-14 PROCEDURE — 93971 EXTREMITY STUDY: CPT

## 2020-08-14 RX ORDER — DEXAMETHASONE SODIUM PHOSPHATE 10 MG/ML
INJECTION, SOLUTION INTRAMUSCULAR; INTRAVENOUS AS NEEDED
Status: DISCONTINUED | OUTPATIENT
Start: 2020-08-14 | End: 2020-08-14

## 2020-08-14 RX ORDER — SODIUM CHLORIDE, SODIUM LACTATE, POTASSIUM CHLORIDE, CALCIUM CHLORIDE 600; 310; 30; 20 MG/100ML; MG/100ML; MG/100ML; MG/100ML
INJECTION, SOLUTION INTRAVENOUS CONTINUOUS PRN
Status: DISCONTINUED | OUTPATIENT
Start: 2020-08-14 | End: 2020-08-14

## 2020-08-14 RX ORDER — PROMETHAZINE HYDROCHLORIDE 25 MG/ML
12.5 INJECTION, SOLUTION INTRAMUSCULAR; INTRAVENOUS ONCE AS NEEDED
Status: DISCONTINUED | OUTPATIENT
Start: 2020-08-14 | End: 2020-08-14 | Stop reason: HOSPADM

## 2020-08-14 RX ORDER — HYDRALAZINE HYDROCHLORIDE 20 MG/ML
5 INJECTION INTRAMUSCULAR; INTRAVENOUS
Status: DISCONTINUED | OUTPATIENT
Start: 2020-08-14 | End: 2020-08-14 | Stop reason: HOSPADM

## 2020-08-14 RX ORDER — LIDOCAINE HYDROCHLORIDE 10 MG/ML
0.5 INJECTION, SOLUTION EPIDURAL; INFILTRATION; INTRACAUDAL; PERINEURAL ONCE AS NEEDED
Status: DISCONTINUED | OUTPATIENT
Start: 2020-08-14 | End: 2020-08-14 | Stop reason: HOSPADM

## 2020-08-14 RX ORDER — ALBUTEROL SULFATE 2.5 MG/3ML
2.5 SOLUTION RESPIRATORY (INHALATION) ONCE AS NEEDED
Status: DISCONTINUED | OUTPATIENT
Start: 2020-08-14 | End: 2020-08-14 | Stop reason: HOSPADM

## 2020-08-14 RX ORDER — LABETALOL 20 MG/4 ML (5 MG/ML) INTRAVENOUS SYRINGE
10
Status: DISCONTINUED | OUTPATIENT
Start: 2020-08-14 | End: 2020-08-14 | Stop reason: HOSPADM

## 2020-08-14 RX ORDER — ONDANSETRON 2 MG/ML
INJECTION INTRAMUSCULAR; INTRAVENOUS AS NEEDED
Status: DISCONTINUED | OUTPATIENT
Start: 2020-08-14 | End: 2020-08-14

## 2020-08-14 RX ORDER — LIDOCAINE HYDROCHLORIDE 10 MG/ML
INJECTION, SOLUTION EPIDURAL; INFILTRATION; INTRACAUDAL; PERINEURAL AS NEEDED
Status: DISCONTINUED | OUTPATIENT
Start: 2020-08-14 | End: 2020-08-14

## 2020-08-14 RX ORDER — METOCLOPRAMIDE HYDROCHLORIDE 5 MG/ML
10 INJECTION INTRAMUSCULAR; INTRAVENOUS ONCE AS NEEDED
Status: DISCONTINUED | OUTPATIENT
Start: 2020-08-14 | End: 2020-08-14 | Stop reason: HOSPADM

## 2020-08-14 RX ORDER — HYDROMORPHONE HCL/PF 1 MG/ML
SYRINGE (ML) INJECTION AS NEEDED
Status: DISCONTINUED | OUTPATIENT
Start: 2020-08-14 | End: 2020-08-14

## 2020-08-14 RX ORDER — MIDAZOLAM HYDROCHLORIDE 2 MG/2ML
INJECTION, SOLUTION INTRAMUSCULAR; INTRAVENOUS AS NEEDED
Status: DISCONTINUED | OUTPATIENT
Start: 2020-08-14 | End: 2020-08-14

## 2020-08-14 RX ORDER — HYDROCODONE BITARTRATE AND ACETAMINOPHEN 5; 325 MG/1; MG/1
1 TABLET ORAL EVERY 6 HOURS PRN
Status: DISCONTINUED | OUTPATIENT
Start: 2020-08-14 | End: 2020-08-14 | Stop reason: HOSPADM

## 2020-08-14 RX ORDER — ONDANSETRON 2 MG/ML
4 INJECTION INTRAMUSCULAR; INTRAVENOUS ONCE AS NEEDED
Status: DISCONTINUED | OUTPATIENT
Start: 2020-08-14 | End: 2020-08-14 | Stop reason: HOSPADM

## 2020-08-14 RX ORDER — HYDROCODONE BITARTRATE AND ACETAMINOPHEN 5; 325 MG/1; MG/1
1 TABLET ORAL EVERY 6 HOURS PRN
Qty: 10 TABLET | Refills: 0 | Status: SHIPPED | OUTPATIENT
Start: 2020-08-14 | End: 2020-08-24

## 2020-08-14 RX ORDER — FENTANYL CITRATE 50 UG/ML
INJECTION, SOLUTION INTRAMUSCULAR; INTRAVENOUS AS NEEDED
Status: DISCONTINUED | OUTPATIENT
Start: 2020-08-14 | End: 2020-08-14

## 2020-08-14 RX ORDER — SODIUM CHLORIDE, SODIUM LACTATE, POTASSIUM CHLORIDE, CALCIUM CHLORIDE 600; 310; 30; 20 MG/100ML; MG/100ML; MG/100ML; MG/100ML
125 INJECTION, SOLUTION INTRAVENOUS CONTINUOUS
Status: CANCELLED | OUTPATIENT
Start: 2020-08-14

## 2020-08-14 RX ORDER — FENTANYL CITRATE/PF 50 MCG/ML
25 SYRINGE (ML) INJECTION
Status: DISCONTINUED | OUTPATIENT
Start: 2020-08-14 | End: 2020-08-14 | Stop reason: HOSPADM

## 2020-08-14 RX ORDER — CEFAZOLIN SODIUM 2 G/50ML
2000 SOLUTION INTRAVENOUS ONCE
Status: COMPLETED | OUTPATIENT
Start: 2020-08-14 | End: 2020-08-14

## 2020-08-14 RX ORDER — PROPOFOL 10 MG/ML
INJECTION, EMULSION INTRAVENOUS AS NEEDED
Status: DISCONTINUED | OUTPATIENT
Start: 2020-08-14 | End: 2020-08-14

## 2020-08-14 RX ORDER — HYDROMORPHONE HCL/PF 1 MG/ML
0.2 SYRINGE (ML) INJECTION
Status: DISCONTINUED | OUTPATIENT
Start: 2020-08-14 | End: 2020-08-14 | Stop reason: HOSPADM

## 2020-08-14 RX ORDER — HYDROMORPHONE HCL/PF 1 MG/ML
0.5 SYRINGE (ML) INJECTION
Status: DISCONTINUED | OUTPATIENT
Start: 2020-08-14 | End: 2020-08-14 | Stop reason: HOSPADM

## 2020-08-14 RX ADMIN — LIDOCAINE HYDROCHLORIDE 100 MG: 10 INJECTION, SOLUTION EPIDURAL; INFILTRATION; INTRACAUDAL; PERINEURAL at 09:55

## 2020-08-14 RX ADMIN — FENTANYL CITRATE 25 MCG: 50 INJECTION, SOLUTION INTRAMUSCULAR; INTRAVENOUS at 10:20

## 2020-08-14 RX ADMIN — FENTANYL CITRATE 25 MCG: 50 INJECTION INTRAMUSCULAR; INTRAVENOUS at 11:46

## 2020-08-14 RX ADMIN — MIDAZOLAM HYDROCHLORIDE 2 MG: 1 INJECTION, SOLUTION INTRAMUSCULAR; INTRAVENOUS at 09:48

## 2020-08-14 RX ADMIN — HYDROMORPHONE HYDROCHLORIDE 0.5 MG: 1 INJECTION, SOLUTION INTRAMUSCULAR; INTRAVENOUS; SUBCUTANEOUS at 10:58

## 2020-08-14 RX ADMIN — SODIUM CHLORIDE, SODIUM LACTATE, POTASSIUM CHLORIDE, AND CALCIUM CHLORIDE: .6; .31; .03; .02 INJECTION, SOLUTION INTRAVENOUS at 09:43

## 2020-08-14 RX ADMIN — CEFAZOLIN SODIUM 2000 MG: 2 SOLUTION INTRAVENOUS at 09:48

## 2020-08-14 RX ADMIN — FENTANYL CITRATE 25 MCG: 50 INJECTION, SOLUTION INTRAMUSCULAR; INTRAVENOUS at 10:00

## 2020-08-14 RX ADMIN — FENTANYL CITRATE 25 MCG: 50 INJECTION INTRAMUSCULAR; INTRAVENOUS at 12:11

## 2020-08-14 RX ADMIN — PROPOFOL 200 MG: 10 INJECTION, EMULSION INTRAVENOUS at 09:55

## 2020-08-14 RX ADMIN — ONDANSETRON 4 MG: 2 INJECTION INTRAMUSCULAR; INTRAVENOUS at 09:55

## 2020-08-14 RX ADMIN — FENTANYL CITRATE 25 MCG: 50 INJECTION INTRAMUSCULAR; INTRAVENOUS at 12:03

## 2020-08-14 RX ADMIN — DEXAMETHASONE SODIUM PHOSPHATE 4 MG: 10 INJECTION, SOLUTION INTRAMUSCULAR; INTRAVENOUS at 09:55

## 2020-08-14 RX ADMIN — FENTANYL CITRATE 25 MCG: 50 INJECTION INTRAMUSCULAR; INTRAVENOUS at 11:57

## 2020-08-14 RX ADMIN — FENTANYL CITRATE 25 MCG: 50 INJECTION INTRAMUSCULAR; INTRAVENOUS at 11:52

## 2020-08-14 RX ADMIN — FENTANYL CITRATE 50 MCG: 50 INJECTION, SOLUTION INTRAMUSCULAR; INTRAVENOUS at 10:29

## 2020-08-14 NOTE — H&P
Assessment/Plan:     Varicose veins of bilateral lower extremities with pain     Plan for left leg endovenous laser ablation and stab phlebectomy  Risks of DVT and recurrence were discussed         Diagnoses and all orders for this visit:     Varicose veins of bilateral lower extremities with pain            Subjective:       Patient ID: Yue Lombardo is a 39 y o  female             HPI  Patient presents for left leg varicose vein treatment surgery  The following portions of the patient's history were reviewed and updated as appropriate: allergies, current medications, past family history, past medical history, past social history, past surgical history and problem list      Review of Systems   Constitutional: Negative  Negative for chills and fever  HENT: Negative  Eyes: Negative  Respiratory: Negative  Cardiovascular: Negative  Negative for leg swelling  Gastrointestinal: Negative  Endocrine: Negative  Genitourinary: Negative  Musculoskeletal: Negative  Skin: Negative  Negative for wound  Allergic/Immunologic: Negative  Neurological: Negative  Hematological: Negative  Psychiatric/Behavioral: Negative  I have reviewed the review of systems as entered and made appropriate changes as necessary     Objective:        /84   Pulse 75   Temp (!) 97 3 °F (36 3 °C) (Temporal)   Resp 18   Ht 5' 5" (1 651 m)   Wt 95 3 kg (210 lb)   SpO2 98%   BMI 34 95 kg/m²               Physical Exam    Left leg scattered varicose veins in the truncal distribution especially behind the knee  Pulm - lungs clear to auscultation bilateral  Heart - regular rate and rhythem, normal heart sounds      Right leg incisions for stab phlebectomy of healed well

## 2020-08-14 NOTE — ANESTHESIA PREPROCEDURE EVALUATION
Procedure:  ENDOVASCULAR LASER THERAPY (EVLT) (Left Leg Upper)    Relevant Problems   CARDIO   (+) Varicose veins of bilateral lower extremities with pain        Physical Exam    Airway    Mallampati score: II  TM Distance: >3 FB  Neck ROM: full     Dental   No notable dental hx     Cardiovascular  Rhythm: regular, Rate: normal, Cardiovascular exam normal    Pulmonary  Pulmonary exam normal Breath sounds clear to auscultation,     Other Findings        Anesthesia Plan  ASA Score- 2     Anesthesia Type- general with ASA Monitors  Additional Monitors:   Airway Plan: LMA  Plan Factors-Exercise tolerance (METS): >4 METS  Chart reviewed  Existing labs reviewed  Patient summary reviewed  Patient is not a current smoker  Induction- intravenous  Postoperative Plan- Plan for postoperative opioid use  Planned trial extubation    Informed Consent- Anesthetic plan and risks discussed with patient  I personally reviewed this patient with the CRNA  Discussed and agreed on the Anesthesia Plan with the CRNA  Era Mora

## 2020-08-14 NOTE — DISCHARGE INSTRUCTIONS
DISCHARGE INSTRUCTIONS   VARICOSE VEIN SURGERY    1) When released from the hospital, you should have a compression bandage in place on the operated leg  This bandage should feel snug but not too tight  If the bandage becomes blood soaked or painfully tight, elevate your leg and call your surgeon immediately  2) If the operated leg becomes increasingly painful or swollen, or if there is increasing redness or pain around your incisions, contact our office  3) On the day of your operation, take it easy and elevate your leg as much as possible  You can take short walks around the house  When sitting, the leg should be elevated  The preferred position is to have the leg at or above the level of the heart  Starting on the first day after surgery, light walking is strongly encouraged as tolerated  After your ultrasound test, you can resume your normal activity, but no heavy lifting or strenuous exercise for 2 weeks  4) Some bruising and redness of the skin is common after varicose vein surgery  This can be lessened by strict elevation of the leg  Many patients will notice some numbness of the shin, ankle, calf, or the top of the foot  This usually fades with time, but may be persistent  After surgery you can expect bruising, swelling and hard knots on your leg  As your body heals the bruising will fade and the swelling and knots will subside  5) Keep your operative dressings on for till your scheduled followup  However if the bandages feel too tight before the office visit then  you can remove all bandages  Your incisions are covered with a surgical glue which will wear away in 1-2 weeks  Start wearing your compression stockings after the bandage is removed  You can use the ACE wraps instead if your leg is too swollen for the compression stocking  Observe incisions daily  Report to our office any of the following:  a) Any areas that are red and angry in appearance    b) Any drainage that is milky or cloudy in appearance or that has a foul odor  c) Elevated temperature of 100 5 degrees F or greater  6) Apply sunscreen with SPF 30 to incisions while sun bathing for up to one year after surgery to reduce the chances of your incisions darkening  7)        Your first post-operative appointment will be 2 to 3 days after your surgery  At this appointment, you will have an ultrasound  You will follow-up with                    your surgeon ~2 weeks after your procedure  8)         If have any questions, please call our office at [phone] [address]  [phone] [facility] FREE [phone]  [address] [place] [address] , [address] [facility], [address]  [address], [address], [address]  [address], [facility],  [address]  [address] [facility], [address]  [address], [address], [address]  [facility], [address] [facility], [address] [facility] [address]   [address] [facility], [address] [facility], [address]  [address] [facility], [address] [place] [address], [facility], [address], Daysi Gambino [address]

## 2020-08-14 NOTE — ANESTHESIA POSTPROCEDURE EVALUATION
Post-Op Assessment Note    CV Status:  Stable  Pain Score: 0    Pain management: adequate     Mental Status:  Alert and awake   Hydration Status:  Euvolemic   PONV Controlled:  Controlled   Airway Patency:  Patent      Post Op Vitals Reviewed: Yes      Staff: CRNA, Anesthesiologist         No complications documented      BP   128/78   Temp     Pulse  79   Resp   15   SpO2   100

## 2020-08-14 NOTE — OP NOTE
OPERATIVE REPORT  PATIENT NAME: Kathi Bello    :  1975  MRN: [id number]  Pt Location: [facility] OR ROOM 04    SURGERY DATE: 2020    Surgeon(s) and Role:     Jermaine Hoffman MD - Primary    Preop Diagnosis:  Varicose veins of both lower extremities with pain [I83 813]    Post-Op Diagnosis Codes: * Varicose veins of both lower extremities with pain [I83 813]    Procedure(s) (LRB):  ENDOVASCULAR LASER THERAPY (EVLT) (Left) and stab phlebectomy x 40    Specimen(s):  * No specimens in log *    Estimated Blood Loss:   Minimal    Drains:  * No LDAs found *    Anesthesia Type:   Choice    Operative Indications:  Varicose veins of both lower extremities with pain [I83 813]      Operative Findings:  Successful ablation of left gsv    Complications:   None    Procedure and Technique: In the preoperative holding area the patient was examined in standing position and superficial varicosities were marked  Patient was brought to the operating room placed in the supine position and general anesthesia was induced via endotracheal intubation  Intravenous antibiotic prophylaxis was administered  Ultrasound was performed and the greater saphenous vein was identified and marked in the medial thigh  Patient's entire    left leg was then prepped and draped in the usual standard sterile fashion  Using ultrasound guidance and micropuncture technique the greater saphenous vein was accessed in the distal thigh  Microwire was advanced  Skin incision was made and the micro-sheath was inserted into the greater saphenous vein  Next a guidewire was inserted under ultrasound guidance into the greater saphenous vein up to the saphenofemoral junction  Then the introducer sheath was advanced up to 2 5 cm away from the saphenofemoral junction  This was documented by ultrasound and marked  Then the laser fiber was inserted into the sheath and locked in position   Positioning of the tip of the laser was again confirmed with ultrasound and it was confirmed to be 2 5 cm away from the saphenofemoral junction  A mesenteric anesthesia was infiltrated circumferentially around the greater saphenous vein  Then endovenous laser ablation was started using 7 W of energy  Slow pullback was carried out at 10 mm/ 7 s and under ultrasound guidance the length of the subfascial greater saphenous vein was ablated  Following which the sheath was removed and pressure was held over the access site for 5 minutes  Completion duplex was performed which demonstrated closure of the greater saphenous vein  The common femoral vein was patent without evidence of thrombosis  There was good augmentation of the common femoral vein flow upon compression of the calf muscles  Hemostasis was achieved by compression  Multiple stab incisions (total 40) were made using 11 blade over the previously marked varicosities  Using mosquito forceps and  Stab phlebectomy hooks we removed these varicosities  Manual pressure was held to achieve hemostasis over the stab incisions  Histoacryl was applied over the stab incisions  The leg was wrapped in a multilayered compression bandage with web roll, Ace wrap  Patient was awakened from general anesthesia and transferred to recovery room in a stable fashion  In the end of the case instrument sponge and needle counts were found to be correct      I was present for the entire procedure  A qualified resident physician was not available        Patient Disposition:  PACU     SIGNATURE: Feliberto Navarrete MD  DATE: August 14, 2020  TIME: 11:57 AM

## 2020-08-17 ENCOUNTER — OFFICE VISIT (OUTPATIENT)
Dept: VASCULAR SURGERY | Facility: CLINIC | Age: 45
End: 2020-08-17

## 2020-08-17 ENCOUNTER — HOSPITAL ENCOUNTER (OUTPATIENT)
Dept: VASCULAR ULTRASOUND | Facility: HOSPITAL | Age: 45
Discharge: HOME | End: 2020-08-17
Attending: SURGERY
Payer: COMMERCIAL

## 2020-08-17 VITALS
HEIGHT: 65 IN | BODY MASS INDEX: 36.65 KG/M2 | DIASTOLIC BLOOD PRESSURE: 91 MMHG | HEART RATE: 81 BPM | RESPIRATION RATE: 18 BRPM | TEMPERATURE: 97.6 F | SYSTOLIC BLOOD PRESSURE: 159 MMHG | WEIGHT: 220 LBS

## 2020-08-17 DIAGNOSIS — I83.813 VARICOSE VEINS OF BILATERAL LOWER EXTREMITIES WITH PAIN: Primary | ICD-10-CM

## 2020-08-17 DIAGNOSIS — I83.813 VARICOSE VEINS OF BILATERAL LOWER EXTREMITIES WITH PAIN: ICD-10-CM

## 2020-08-17 PROCEDURE — 93971 EXTREMITY STUDY: CPT

## 2020-08-17 PROCEDURE — 99024 POSTOP FOLLOW-UP VISIT: CPT | Performed by: PHYSICIAN ASSISTANT

## 2020-08-17 PROCEDURE — 3008F BODY MASS INDEX DOCD: CPT | Performed by: PHYSICIAN ASSISTANT

## 2020-08-17 RX ORDER — ACETAMINOPHEN 325 MG/1
650 TABLET ORAL EVERY 6 HOURS PRN
COMMUNITY

## 2020-08-17 NOTE — LETTER
August 18, 2020     Wolf Chavez PA-C  [address] [facility]    Patient: Jayda Romeo   YOB: 1975   Date of Visit: 8/17/2020     Dear Dr Manuel Conway      Thank you for referring Jayda Romeo to me for evaluation  Below are the relevant portions of my assessment and plan of care  If you have questions, please do not hesitate to call me  I look forward to following Josh along with you  Sincerely,        Mary Painting PA-C        CC: Rodrigo Frias MD    Progress Notes:      Discussion:  Healing as expected after L EVLT  No concerning symptoms  She will have post-EVLT du today  I will send her for evaluation with Tank Carrington NP for treatment of spider veins once she heals from EVLT  -Post-EVLT  Du/ultrasound today, 8/17 at 1 PM  -graded compression when comfortable and able to tolerate  -Elevate leg as often as you can  -Activity as tolerated  -Post EVLT instructions reviewed with patient  -Call right away, if you feel ill, develop fever, chest pain, shortness of breath, increased leg pain,  redness at procedure sites, bleeding, numbness of the leg or pain/discoloration of feet  -Follow up 4-6 weeks with RUFINO Carrington   We discussed reasons why she should be seen sooner     -Refer to [facility] for treatment of spider veins

## 2020-08-17 NOTE — PROGRESS NOTES
Assessment/Plan:    S/P LEFT lower extremity endovascular laser therapy and stab phlebectomy x 40 (ELANA 8/14/2020    POD #3   - doing well after procedure  - mild numbness in the foot after bandage removed  - walking well; no leg pain; no cp or sob; no fevers, chills  - Hx EHIT after RIGHT laser ablation    Bandage removed in the office today  There is mild edema and mild - moderate ecchymosis over the extremity There are 40 stab sites which were secured with surgical glue  There is no heat to the thigh or stab sites  There are no bleeding or infected stab sites  Patient pleased with result  Discussion:  Healing as expected after L EVLT  No concerning symptoms  She will have post-EVLT du today  I will send her for evaluation with Luigi Beaver NP for treatment of spider veins once she heals from EVLT  -Post-EVLT  Du/ultrasound today, 8/17 at 1 PM  -graded compression when comfortable and able to tolerate  -Elevate leg as often as you can  -Activity as tolerated  -Post EVLT instructions reviewed with patient  -Call right away, if you feel ill, develop fever, chest pain, shortness of breath, increased leg pain,  redness at procedure sites, bleeding, numbness of the leg or pain/discoloration of feet  -Follow up 4-6 weeks with RUFINO Beaver  We discussed reasons why she should be seen sooner     -Refer to Xochitl Miose for treatment of spider veins      Subjective:      Patient ID: Yue Lombardo is a 39 y o  female  Patient presents in office for dressing removal of L leg  Patient had L leg EVLT done on 8/14 by Dr Monika Shannon  Patient reports L foot swelling  Patient denies any pain or numbness  Patient denies any fever or chills  HPI  enoc Loran Boast is a 39 y o  female post-operative day #3 LEFT EVLT  She is walking well  No leg/ calf pain  No SOB or CP  No fevers, chills  Bandage removed in the office today   There is mild edema and mild - moderate ecchymosis over the extremity There are 40 stab sites which were secured with surgical glue  There is no heat to the thigh or stab sites  There are no bleeding or infected stab sites  Patient pleased with result  She reports hx of EHIT after laser ablation to the right leg  The following portions of the patient's history were reviewed and updated as appropriate: allergies, current medications, past family history, past medical history, past social history, past surgical history and problem list     Review of Systems   Constitutional: Negative  Negative for chills and fever  HENT: Negative  Negative for sinus pain, sneezing and sore throat  Eyes: Negative  Negative for pain  Respiratory: Negative  Negative for cough, chest tightness and shortness of breath  Cardiovascular: Positive for leg swelling (L foot)  Gastrointestinal: Negative  Negative for diarrhea, nausea and vomiting  Endocrine: Negative  Genitourinary: Negative  Negative for difficulty urinating  Musculoskeletal: Negative  Skin: Positive for wound (Post OP EVLT)  Allergic/Immunologic: Negative  Neurological: Negative  Negative for numbness and headaches  Hematological: Negative  Psychiatric/Behavioral: Negative  Negative for self-injury  Objective:      /91 (BP Location: Left arm, Patient Position: Sitting, Cuff Size: Adult)   Pulse 81   Temp 97 6 °F (36 4 °C) (Tympanic)   Resp 18   Ht 5' 5" (1 651 m)   Wt 99 8 kg (220 lb)   BMI 36 61 kg/m²          Physical Exam        AA+O x3  I have reviewed and made appropriate changes to the review of systems input by the medical assistant      Vitals:    08/17/20 1036   BP: 159/91   BP Location: Left arm   Patient Position: Sitting   Cuff Size: Adult   Pulse: 81   Resp: 18   Temp: 97 6 °F (36 4 °C)   TempSrc: Tympanic   Weight: 99 8 kg (220 lb)   Height: 5' 5" (1 651 m)       Patient Active Problem List   Diagnosis    Varicose veins of bilateral lower extremities with pain    Ankle instability    Ankle pain       Past Surgical History:   Procedure Laterality Date    GASTRIC BYPASS  2004    10 years ago    PLANTAR FASCIA RELEASE Right     [place] ENDOVENOUS LASER, 1ST VEIN Right 1/13/2020    Procedure: ENDOVASCULAR LASER THERAPY (EVLT);   Surgeon: Theo Álvarez MD;  [facility];  Service: Vascular    [place] ENDOVENOUS LASER, 1ST VEIN Left 8/14/2020    Procedure: ENDOVASCULAR LASER THERAPY (EVLT); MULTIPLE STAB PHLEBECTOMIES;  Surgeon: Theo Álvarez MD;  [facility];  Service: Vascular       Family History   Problem Relation Age of Onset    Asthma Mother     No Known Problems Father        Social History     Socioeconomic History    Marital status: /Civil Union     Spouse name: Not on file    Number of children: Not on file    Years of education: Not on file    Highest education level: Not on file   Occupational History    Not on file   Social Needs    Financial resource strain: Not on file    Food insecurity     Worry: Not on file     Inability: Not on file   [employer] needs     Medical: Not on file     Non-medical: Not on file   Tobacco Use    Smoking status: Never Smoker    Smokeless tobacco: Never Used   Substance and Sexual Activity    Alcohol use: Yes     Frequency: Monthly or less     Drinks per session: 1 or 2     Binge frequency: Less than monthly    Drug use: No    Sexual activity: Yes   Lifestyle    Physical activity     Days per week: Not on file     Minutes per session: Not on file    Stress: Not on file   Relationships    Social connections     Talks on phone: Not on file     Gets together: Not on file     Attends [employer] service: Not on file     Active member of club or organization: Not on file     Attends meetings of clubs or organizations: Not on file     Relationship status: Not on file    Intimate partner violence     Fear of current or ex partner: Not on file Emotionally abused: Not on file     Physically abused: Not on file     Forced sexual activity: Not on file   Other Topics Concern    Not on file   Social History Narrative    Not on file       No Known Allergies      Current Outpatient Medications:     acetaminophen (TYLENOL) 325 mg tablet, Take 650 mg by mouth every 6 (six) hours as needed for mild pain, Disp: , Rfl:     ascorbic acid (VITAMIN C) 500 mg tablet, Take 500 mg by mouth daily, Disp: , Rfl:     cyanocobalamin (VITAMIN B-12) 100 mcg tablet, Take by mouth daily, Disp: , Rfl:     EVENING PRIMROSE OIL PO, Take 1,000 mg by mouth daily, Disp: , Rfl:     HYDROcodone-acetaminophen (NORCO) 5-325 mg per tablet, Take 1 tablet by mouth every 6 (six) hours as needed for pain for up to 10 daysMax Daily Amount: 4 tablets (Patient not taking: Reported on 8/17/2020), Disp: 10 tablet, Rfl: 0    multivitamin (THERAGRAN) TABS, Take 1 tablet by mouth, Disp: , Rfl:     Psyllium (METAMUCIL PO), Take 2 tablets by mouth daily, Disp: , Rfl:     TURMERIC PO, Take 320 mg by mouth daily, Disp: , Rfl:     VIORELE 0 15-0 02/0 01 MG (21/5) per tablet, Take 1 tablet by mouth daily, Disp: , Rfl: 4

## 2020-08-17 NOTE — PATIENT INSTRUCTIONS
S/P endovascular laser therapy and stab phlebectomy x 40    - doing well  after procedure    Plan:  -Post-EVLT  ultrasound today, 8/17 at 1 PM  -graded compression when comfortable and able to tolerate  -Elevate leg as often as you can  -Activity as tolerated  -Continue healthy lifestyle changes; heart-healthy diet, low sodium and regular walking   -Tylenol or ice for mild soreness  -Patient education for venous insufficiency  -may shower; avoid lotions, creams to leg until everything is healed    -Call right away, if you feel ill, develop fever, chest pain, shortness of breath, increased leg pain,  redness at procedure sites, bleeding, numbness of the leg or pain/discoloration of feet      -Follow up 4-6 weeks with Dr Scarlett Palma or ARELY    -Refer to [facility] [place] for treatment of spider veins

## 2020-08-18 PROCEDURE — 93971 EXTREMITY STUDY: CPT | Performed by: SURGERY

## 2020-09-16 ENCOUNTER — TELEPHONE (OUTPATIENT)
Dept: VASCULAR SURGERY | Facility: CLINIC | Age: 45
End: 2020-09-16

## 2020-09-17 ENCOUNTER — OFFICE VISIT (OUTPATIENT)
Dept: VASCULAR SURGERY | Facility: CLINIC | Age: 45
End: 2020-09-17

## 2020-09-17 VITALS
DIASTOLIC BLOOD PRESSURE: 86 MMHG | TEMPERATURE: 98.7 F | BODY MASS INDEX: 36.49 KG/M2 | WEIGHT: 219 LBS | HEART RATE: 84 BPM | HEIGHT: 65 IN | SYSTOLIC BLOOD PRESSURE: 122 MMHG

## 2020-09-17 DIAGNOSIS — I83.813 VARICOSE VEINS OF BILATERAL LOWER EXTREMITIES WITH PAIN: Primary | ICD-10-CM

## 2020-09-17 PROCEDURE — 99024 POSTOP FOLLOW-UP VISIT: CPT | Performed by: NURSE PRACTITIONER

## 2020-09-17 NOTE — PATIENT INSTRUCTIONS
Injection sclero packet given  Will plan a full set of injections to bilateral lower extremities  Areas of primary concern right medial ankle, right lateral thigh and left lateral thigh  Multiple sessions may be required  Will start with 1 full session, Asclera 0 5% solution, 10ml, $500  We discussed the risk of a permanent hyperpigmentation and the need to wear compression stockings after injections  Will take photos at next visit   Bring full compression stockings to next visit

## 2020-09-17 NOTE — ASSESSMENT & PLAN NOTE
55-year-old female with history of symptomatic bilateral lower extremity varicosities status post right GSV EVLT wound stab phlebectomies by Dr Atiya Lopez 9/21/82 complicated by right calf vein DVT treated with Xarelto now  Status post left GSV EVLT and stab phlebectomies X 40 by Dr Atiya Lopez 8/14/20  She presents to the office for 4 week postop visit and also to discuss injection sclerotherapy   -Phlebectomy sites are healed   -Legs feel "great" from a venous standpoint postoperatively  -Injection sclero packet given  -Will plan a full set of injections to bilateral lower extremities  -Areas of primary concern right medial ankle, right lateral thigh and left lateral thigh    -Multiple sessions may be required    -Will start with 1 full session, Asclera 0 5% solution, 10ml, $500, 2 hours  -We discussed the risk of a permanent hyperpigmentation and the need to wear compression stockings after injections    -Will take photos at next visit   Bring full compression stockings to next visit

## 2020-09-17 NOTE — PROGRESS NOTES
Assessment/Plan:    Varicose veins of bilateral lower extremities with pain    69-year-old female with history of symptomatic bilateral lower extremity varicosities status post right GSV EVLT wound stab phlebectomies by Dr Tim Dates 9/62/15 complicated by right calf vein DVT treated with Xarelto now  Status post left GSV EVLT and stab phlebectomies X 40 by Dr Tim Dates 8/14/20  She presents to the office for 4 week postop visit and also to discuss injection sclerotherapy   -Phlebectomy sites are healed   -Legs feel "great" from a venous standpoint postoperatively  -Injection sclero packet given  -Will plan a full set of injections to bilateral lower extremities  -Areas of primary concern right medial ankle, right lateral thigh and left lateral thigh    -Multiple sessions may be required    -Will start with 1 full session, Asclera 0 5% solution, 10ml, $500, 2 hours  -We discussed the risk of a permanent hyperpigmentation and the need to wear compression stockings after injections    -Will take photos at next visit  Bring full compression stockings to next visit         Diagnoses and all orders for this visit:    Varicose veins of bilateral lower extremities with pain          Subjective:      Patient ID: Liza Marsh is a 39 y o  female  Patient is here for 4-6 week F/u L EVLT done on 8/14/20 and consult sclera injections for spider veins  Patient states for the L leg from the previous EVLT she is feeling great  It is healing well  Patient does C/o of BL LE spider veins  Patient did have previously a bleeding vein in the R leg but it was taken care of during her last EVLT surgery  She hasn't had any recent bleeding episodes  Patient C/o of aching at the end of the day  Patient does wear compression stockings and elevate legs  HPI     Patient presents to the office for four-week follow-up postop left EVLT    She notes significant improvement in bilateral lower extremity though does still remain with some throbbing of the thigh at night  She describes this as minimal compared to preoperative symptoms  She continues to wear compression socks  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    The following portions of the patient's history were reviewed and updated as appropriate: allergies, current medications, past family history, past medical history, past social history, past surgical history and problem list    review of systems reviewed    Review of Systems   Constitutional: Negative  Negative for chills and fever  HENT: Negative  Eyes: Negative  Respiratory: Negative  Negative for chest tightness and shortness of breath  Cardiovascular: Negative  Negative for chest pain  Gastrointestinal: Negative  Endocrine: Negative  Genitourinary: Negative  Musculoskeletal:        Aching in BL LE at the end of the day  Skin: Negative for color change and wound  Spider veins    Allergic/Immunologic: Negative  Neurological: Negative  Hematological: Negative  Psychiatric/Behavioral: Negative  Objective:    I have reviewed and made appropriate changes to the review of systems input by the medical assistant  Vitals:    09/17/20 0824   BP: 122/86   BP Location: Right arm   Patient Position: Sitting   Cuff Size: Standard   Pulse: 84   Temp: 98 7 °F (37 1 °C)   TempSrc: Tympanic   Weight: 99 3 kg (219 lb)   Height: 5' 5" (1 651 m)       Patient Active Problem List   Diagnosis    Varicose veins of bilateral lower extremities with pain    Ankle instability    Ankle pain       Past Surgical History:   Procedure Laterality Date    GASTRIC BYPASS  2004    10 years ago    PLANTAR FASCIA RELEASE Right     [place] ENDOVENOUS LASER, 1ST VEIN Right 1/13/2020    Procedure: ENDOVASCULAR LASER THERAPY (EVLT);   Surgeon: Vi Adams MD;  [facility];  Service: Vascular    [place] ENDOVENOUS LASER, 1ST VEIN Left 8/14/2020    Procedure: ENDOVASCULAR LASER THERAPY (EVLT); MULTIPLE STAB PHLEBECTOMIES;  Surgeon: Geovanni Conway MD;  [facility];  Service: Vascular       Family History   Problem Relation Age of Onset    Asthma Mother     No Known Problems Father        Social History     Socioeconomic History    Marital status: /Civil Union     Spouse name: Not on file    Number of children: Not on file    Years of education: Not on file    Highest education level: Not on file   Occupational History    Not on file   Social Needs    Financial resource strain: Not on file    Food insecurity     Worry: Not on file     Inability: Not on file   [employer] needs     Medical: Not on file     Non-medical: Not on file   Tobacco Use    Smoking status: Never Smoker    Smokeless tobacco: Never Used   Substance and Sexual Activity    Alcohol use: Yes     Frequency: Monthly or less     Drinks per session: 1 or 2     Binge frequency: Less than monthly    Drug use: No    Sexual activity: Yes   Lifestyle    Physical activity     Days per week: Not on file     Minutes per session: Not on file    Stress: Not on file   Relationships    Social connections     Talks on phone: Not on file     Gets together: Not on file     Attends Zoroastrianism service: Not on file     Active member of club or organization: Not on file     Attends meetings of clubs or organizations: Not on file     Relationship status: Not on file    Intimate partner violence     Fear of current or ex partner: Not on file     Emotionally abused: Not on file     Physically abused: Not on file     Forced sexual activity: Not on file   Other Topics Concern    Not on file   Social History Narrative    Not on file       No Known Allergies      Current Outpatient Medications:     acetaminophen (TYLENOL) 325 mg tablet, Take 650 mg by mouth every 6 (six) hours as needed for mild pain, Disp: , Rfl:     ascorbic acid (VITAMIN C) 500 mg tablet, Take 500 mg by mouth daily, Disp: , Rfl:    cyanocobalamin (VITAMIN B-12) 100 mcg tablet, Take by mouth daily, Disp: , Rfl:     EVENING PRIMROSE OIL PO, Take 1,000 mg by mouth daily, Disp: , Rfl:     multivitamin (THERAGRAN) TABS, Take 1 tablet by mouth, Disp: , Rfl:     Psyllium (METAMUCIL PO), Take 2 tablets by mouth daily, Disp: , Rfl:     TURMERIC PO, Take 320 mg by mouth daily, Disp: , Rfl:     /86 (BP Location: Right arm, Patient Position: Sitting, Cuff Size: Standard)   Pulse 84   Temp 98 7 °F (37 1 °C) (Tympanic)   Ht 5' 5" (1 651 m)   Wt 99 3 kg (219 lb)   BMI 36 44 kg/m²          Physical Exam     left lower extremity phlebectomy sites are healed  She has spider telangiectasias of the left lateral thigh and right medial ankle and right lateral thigh

## 2020-10-23 ENCOUNTER — TELEPHONE (OUTPATIENT)
Dept: FAMILY MEDICINE CLINIC | Facility: CLINIC | Age: 45
End: 2020-10-23

## 2020-10-23 ENCOUNTER — TELEPHONE (OUTPATIENT)
Dept: VASCULAR SURGERY | Facility: CLINIC | Age: 45
End: 2020-10-23

## 2020-10-26 ENCOUNTER — CLINICAL SUPPORT (OUTPATIENT)
Dept: VASCULAR SURGERY | Facility: CLINIC | Age: 45
End: 2020-10-26

## 2020-10-26 VITALS
HEART RATE: 80 BPM | SYSTOLIC BLOOD PRESSURE: 118 MMHG | TEMPERATURE: 98.8 F | DIASTOLIC BLOOD PRESSURE: 72 MMHG | HEIGHT: 65 IN | WEIGHT: 218 LBS | BODY MASS INDEX: 36.32 KG/M2 | RESPIRATION RATE: 12 BRPM

## 2020-10-26 DIAGNOSIS — I83.813 VARICOSE VEINS OF BILATERAL LOWER EXTREMITIES WITH PAIN: Primary | ICD-10-CM

## 2020-10-26 PROCEDURE — 36468 NJX SCLRSNT SPIDER VEINS: CPT | Performed by: NURSE PRACTITIONER

## 2021-08-20 ENCOUNTER — IMPORTED ENCOUNTER (OUTPATIENT)
Dept: URBAN - NONMETROPOLITAN AREA CLINIC 1 | Facility: CLINIC | Age: 46
End: 2021-08-20

## 2021-08-20 PROBLEM — H52.4: Noted: 2021-08-20

## 2021-08-20 PROBLEM — H25.013: Noted: 2021-08-20

## 2021-08-20 PROCEDURE — 92310 CONTACT LENS FITTING OU: CPT

## 2021-08-20 PROCEDURE — 92015 DETERMINE REFRACTIVE STATE: CPT

## 2021-08-20 PROCEDURE — 92004 COMPRE OPH EXAM NEW PT 1/>: CPT

## 2021-08-20 NOTE — PATIENT DISCUSSION
Myopia / Presbyopia OU - Discussed diagnosis in detail with patient- New Glasses and CL RX given today- Patient may also try wearing only OS CL - Continue to monitor- RTC 1 year complete Cataracts OU- Discussed diagnosis in detail with patient- Discussed signs and symptoms of progression- Discussed UV protection- No treatment needed at this time- Continue to monitor

## 2022-04-05 ENCOUNTER — TELEPHONE (OUTPATIENT)
Dept: FAMILY MEDICINE CLINIC | Facility: CLINIC | Age: 47
End: 2022-04-05

## 2022-04-05 NOTE — TELEPHONE ENCOUNTER
Left message for patient to return call back to either let us know if she is/ is not established with us as well as schedule if she is still established

## 2022-04-09 ASSESSMENT — KERATOMETRY
OS_K1POWER_DIOPTERS: 42.50
OS_AXISANGLE_DEGREES: 178
OD_AXISANGLE_DEGREES: 008
OD_K2POWER_DIOPTERS: 42.75
OD_K1POWER_DIOPTERS: 42.25
OS_K2POWER_DIOPTERS: 42.75

## 2022-04-09 ASSESSMENT — TONOMETRY
OD_IOP_MMHG: 15
OS_IOP_MMHG: 15

## 2022-04-09 ASSESSMENT — VISUAL ACUITY
OD_CC: 20/20-
OS_CC: 20/20

## 2022-11-08 ENCOUNTER — COMPREHENSIVE EXAM (OUTPATIENT)
Dept: URBAN - NONMETROPOLITAN AREA CLINIC 1 | Facility: CLINIC | Age: 47
End: 2022-11-08

## 2022-11-08 DIAGNOSIS — H52.4: ICD-10-CM

## 2022-11-08 PROCEDURE — 92014 COMPRE OPH EXAM EST PT 1/>: CPT

## 2022-11-08 PROCEDURE — 92015 DETERMINE REFRACTIVE STATE: CPT

## 2022-11-08 ASSESSMENT — KERATOMETRY
OD_K1POWER_DIOPTERS: 42.25
OD_AXISANGLE_DEGREES: 008
OD_K2POWER_DIOPTERS: 42.75
OS_K2POWER_DIOPTERS: 42.75
OS_AXISANGLE_DEGREES: 178
OS_K1POWER_DIOPTERS: 42.50

## 2022-11-08 ASSESSMENT — TONOMETRY
OS_IOP_MMHG: 14
OD_IOP_MMHG: 15

## 2022-11-08 ASSESSMENT — VISUAL ACUITY
OS_CC: 20/20
OD_CC: 20/20

## 2022-11-08 NOTE — PATIENT DISCUSSION
Discussed diagnosis in detail with patient. Recommend patient taking Tobradex every 2 hours and cool compresses. Return to clinic Friday for follow up.

## 2022-11-11 ENCOUNTER — FOLLOW UP (OUTPATIENT)
Dept: URBAN - NONMETROPOLITAN AREA CLINIC 1 | Facility: CLINIC | Age: 47
End: 2022-11-11

## 2022-11-11 DIAGNOSIS — H11.442: ICD-10-CM

## 2022-11-11 PROCEDURE — 99213 OFFICE O/P EST LOW 20 MIN: CPT

## 2022-11-11 ASSESSMENT — KERATOMETRY
OD_K2POWER_DIOPTERS: 42.75
OD_AXISANGLE_DEGREES: 008
OD_K1POWER_DIOPTERS: 42.25
OS_K1POWER_DIOPTERS: 42.50
OS_K2POWER_DIOPTERS: 42.75
OS_AXISANGLE_DEGREES: 178

## 2022-11-11 ASSESSMENT — TONOMETRY
OS_IOP_MMHG: 14
OD_IOP_MMHG: 15

## 2022-11-11 ASSESSMENT — VISUAL ACUITY
OD_SC: 20/20
OS_SC: 20/20

## 2022-11-11 NOTE — PATIENT DISCUSSION
Discussed diagnosis in detail with patient. Recommend patient taking Tobradex TID x few days then start slow taper to BID x few days QD x few days then stop.  Continue to monitor.

## 2024-02-29 ENCOUNTER — ESTABLISHED PATIENT (OUTPATIENT)
Dept: URBAN - NONMETROPOLITAN AREA CLINIC 1 | Facility: CLINIC | Age: 49
End: 2024-02-29

## 2024-02-29 DIAGNOSIS — H52.4: ICD-10-CM

## 2024-02-29 PROCEDURE — 92014 COMPRE OPH EXAM EST PT 1/>: CPT

## 2024-02-29 PROCEDURE — 92015 DETERMINE REFRACTIVE STATE: CPT

## 2024-02-29 ASSESSMENT — VISUAL ACUITY
OD_CC: 20/20
OS_CC: 20/20
OU_CC: 20/20

## 2024-02-29 ASSESSMENT — KERATOMETRY
OS_K1POWER_DIOPTERS: 42.50
OD_K2POWER_DIOPTERS: 42.75
OS_K2POWER_DIOPTERS: 42.75
OD_AXISANGLE_DEGREES: 008
OD_K1POWER_DIOPTERS: 42.25
OS_AXISANGLE_DEGREES: 178

## 2024-02-29 ASSESSMENT — TONOMETRY
OS_IOP_MMHG: 14
OD_IOP_MMHG: 14

## 2024-06-27 ENCOUNTER — TELEPHONE (OUTPATIENT)
Dept: FAMILY MEDICINE CLINIC | Facility: CLINIC | Age: 49
End: 2024-06-27

## 2024-06-27 NOTE — TELEPHONE ENCOUNTER
Please remove Dariela Mak PA-C AS PCP. Patient has established elsewhere     Antonietta Parson, [facility]   [address] [place] [address] [phone] (Work)   [phone] (Fax)

## 2024-06-30 NOTE — TELEPHONE ENCOUNTER
06/29/24 11:47 PM        The office's request has been received, reviewed, and the patient chart updated. The PCP has successfully been removed with a patient attribution note. This message will now be completed.        Thank you  Malena Vergara

## 2025-03-04 ENCOUNTER — COMPREHENSIVE EXAM (OUTPATIENT)
Age: 50
End: 2025-03-04

## 2025-03-04 DIAGNOSIS — H52.4: ICD-10-CM

## 2025-03-04 PROCEDURE — 92015 DETERMINE REFRACTIVE STATE: CPT

## 2025-03-04 PROCEDURE — 92014 COMPRE OPH EXAM EST PT 1/>: CPT

## (undated) DEVICE — DRAPE SHEET X-LG

## (undated) DEVICE — GLOVE SRG BIOGEL 7.5

## (undated) DEVICE — ACE WRAP 4 IN UNSTERILE

## (undated) DEVICE — BAG DECANTER

## (undated) DEVICE — 3M™ STERI-STRIP™ REINFORCED ADHESIVE SKIN CLOSURES, R1542, 1/4 IN X 1-1/2 IN (6 MM X 38 MM), 6 STRIPS/ENVELOPE: Brand: 3M™ STERI-STRIP™

## (undated) DEVICE — TONGUE DEPRESSOR STERILE

## (undated) DEVICE — COVER PROBE INTRAOPERATIVE 6 X 96 IN

## (undated) DEVICE — LIGHT HANDLE COVER SLEEVE DISP BLUE STELLAR

## (undated) DEVICE — TIBURON SPLIT SHEET: Brand: CONVERTORS

## (undated) DEVICE — INTENDED FOR TISSUE SEPARATION, AND OTHER PROCEDURES THAT REQUIRE A SHARP SURGICAL BLADE TO PUNCTURE OR CUT.: Brand: BARD-PARKER SAFETY BLADES SIZE 11, STERILE

## (undated) DEVICE — CHLORAPREP HI-LITE 26ML ORANGE

## (undated) DEVICE — GAUZE SPONGES,16 PLY: Brand: CURITY

## (undated) DEVICE — BETHLEHEM UNIVERSAL MINOR GEN: Brand: CARDINAL HEALTH

## (undated) DEVICE — PADDING CAST 4 IN  COTTON STRL

## (undated) DEVICE — ACE WRAP 6 IN UNSTERILE

## (undated) DEVICE — Device

## (undated) DEVICE — ULTRASOUND GEL STERILE FOIL PK

## (undated) DEVICE — SPONGE STICK WITH PVP-I: Brand: KENDALL

## (undated) DEVICE — SUT SILK 3-0 18 IN A184H

## (undated) DEVICE — FIBER PROC KIT GOLD TIP 21G 45CM NEVERTOUCH

## (undated) DEVICE — INTENDED FOR TISSUE SEPARATION, AND OTHER PROCEDURES THAT REQUIRE A SHARP SURGICAL BLADE TO PUNCTURE OR CUT.: Brand: BARD-PARKER SAFETY BLADES SIZE 15, STERILE

## (undated) DEVICE — ADHESIVE SKIN HIGH VISCOSITY EXOFIN 1ML

## (undated) DEVICE — DRAPE SHEET THREE QUARTER

## (undated) DEVICE — GLOVE INDICATOR PI UNDERGLOVE SZ 8 BLUE